# Patient Record
Sex: FEMALE | URBAN - METROPOLITAN AREA
[De-identification: names, ages, dates, MRNs, and addresses within clinical notes are randomized per-mention and may not be internally consistent; named-entity substitution may affect disease eponyms.]

---

## 2019-03-25 ENCOUNTER — APPOINTMENT (RX ONLY)
Dept: URBAN - METROPOLITAN AREA CLINIC 4 | Facility: CLINIC | Age: 74
Setting detail: DERMATOLOGY
End: 2019-03-25

## 2019-03-25 DIAGNOSIS — Z71.89 OTHER SPECIFIED COUNSELING: ICD-10-CM

## 2019-03-25 DIAGNOSIS — D22 MELANOCYTIC NEVI: ICD-10-CM

## 2019-03-25 DIAGNOSIS — D18.0 HEMANGIOMA: ICD-10-CM

## 2019-03-25 DIAGNOSIS — L57.0 ACTINIC KERATOSIS: ICD-10-CM

## 2019-03-25 DIAGNOSIS — L81.4 OTHER MELANIN HYPERPIGMENTATION: ICD-10-CM

## 2019-03-25 DIAGNOSIS — L57.8 OTHER SKIN CHANGES DUE TO CHRONIC EXPOSURE TO NONIONIZING RADIATION: ICD-10-CM

## 2019-03-25 DIAGNOSIS — L82.1 OTHER SEBORRHEIC KERATOSIS: ICD-10-CM

## 2019-03-25 PROBLEM — D22.5 MELANOCYTIC NEVI OF TRUNK: Status: ACTIVE | Noted: 2019-03-25

## 2019-03-25 PROBLEM — F41.9 ANXIETY DISORDER, UNSPECIFIED: Status: ACTIVE | Noted: 2019-03-25

## 2019-03-25 PROBLEM — I10 ESSENTIAL (PRIMARY) HYPERTENSION: Status: ACTIVE | Noted: 2019-03-25

## 2019-03-25 PROBLEM — F32.9 MAJOR DEPRESSIVE DISORDER, SINGLE EPISODE, UNSPECIFIED: Status: ACTIVE | Noted: 2019-03-25

## 2019-03-25 PROBLEM — E13.9 OTHER SPECIFIED DIABETES MELLITUS WITHOUT COMPLICATIONS: Status: ACTIVE | Noted: 2019-03-25

## 2019-03-25 PROBLEM — D18.01 HEMANGIOMA OF SKIN AND SUBCUTANEOUS TISSUE: Status: ACTIVE | Noted: 2019-03-25

## 2019-03-25 PROBLEM — D48.5 NEOPLASM OF UNCERTAIN BEHAVIOR OF SKIN: Status: ACTIVE | Noted: 2019-03-25

## 2019-03-25 PROCEDURE — 17003 DESTRUCT PREMALG LES 2-14: CPT

## 2019-03-25 PROCEDURE — ? ADDITIONAL NOTES

## 2019-03-25 PROCEDURE — ? LIQUID NITROGEN

## 2019-03-25 PROCEDURE — ? COUNSELING

## 2019-03-25 PROCEDURE — 17000 DESTRUCT PREMALG LESION: CPT | Mod: 59

## 2019-03-25 PROCEDURE — 99203 OFFICE O/P NEW LOW 30 MIN: CPT | Mod: 25

## 2019-03-25 PROCEDURE — ? BIOPSY BY SHAVE METHOD

## 2019-03-25 PROCEDURE — 11102 TANGNTL BX SKIN SINGLE LES: CPT

## 2019-03-25 ASSESSMENT — LOCATION SIMPLE DESCRIPTION DERM
LOCATION SIMPLE: LEFT CHEEK
LOCATION SIMPLE: RIGHT FOREARM
LOCATION SIMPLE: UPPER BACK
LOCATION SIMPLE: RIGHT TEMPLE
LOCATION SIMPLE: LEFT FOREARM
LOCATION SIMPLE: CHEST
LOCATION SIMPLE: LEFT LOWER BACK
LOCATION SIMPLE: RIGHT CHEEK
LOCATION SIMPLE: RIGHT LOWER BACK

## 2019-03-25 ASSESSMENT — LOCATION DETAILED DESCRIPTION DERM
LOCATION DETAILED: UPPER STERNUM
LOCATION DETAILED: LEFT INFERIOR MEDIAL MIDBACK
LOCATION DETAILED: RIGHT DISTAL DORSAL FOREARM
LOCATION DETAILED: LEFT PROXIMAL DORSAL FOREARM
LOCATION DETAILED: INFERIOR THORACIC SPINE
LOCATION DETAILED: LEFT DISTAL DORSAL FOREARM
LOCATION DETAILED: RIGHT SUPERIOR MEDIAL MIDBACK
LOCATION DETAILED: LEFT INFERIOR CENTRAL MALAR CHEEK
LOCATION DETAILED: RIGHT INFERIOR CENTRAL MALAR CHEEK
LOCATION DETAILED: RIGHT MEDIAL TEMPLE
LOCATION DETAILED: STERNAL NOTCH
LOCATION DETAILED: RIGHT PROXIMAL DORSAL FOREARM

## 2019-03-25 ASSESSMENT — LOCATION ZONE DERM
LOCATION ZONE: ARM
LOCATION ZONE: FACE
LOCATION ZONE: TRUNK

## 2019-03-25 NOTE — PROCEDURE: LIQUID NITROGEN
Consent: The patient's consent was obtained including but not limited to risks of crusting, scabbing, blistering, scarring, darker or lighter pigmentary change, recurrence, incomplete removal and infection.
Duration Of Freeze Thaw-Cycle (Seconds): 0
Post-Care Instructions: I reviewed with the patient in detail post-care instructions. Patient is to wear sunprotection, and avoid picking at any of the treated lesions. Pt may apply Vaseline  or Aquaphor to crusted or scabbing areas.
Detail Level: Detailed
Render Post-Care Instructions In Note?: no

## 2019-03-25 NOTE — HPI: SKIN LESION
Is This A New Presentation, Or A Follow-Up?: Skin Lesion
What Type Of Note Output Would You Prefer (Optional)?: Standard Output
How Severe Is Your Skin Lesion?: mild
Has Your Skin Lesion Been Treated?: been treated
Additional History: Patient says that it got cut off years ago but unsure.

## 2019-03-25 NOTE — HPI: FULL BODY SKIN EXAMINATION
How Severe Are Your Spot(S)?: mild
What Type Of Note Output Would You Prefer (Optional)?: Standard Output
What Is The Reason For Today's Visit?: Full Body Skin Examination
What Is The Reason For Today's Visit? (Being Monitored For X): concerning skin lesions on an annual basis
Additional History: Patient grew up in [place]. Hx of sunburns but not blistering. No hx of tanning beds.

## 2019-03-25 NOTE — PROCEDURE: BIOPSY BY SHAVE METHOD
Electrodesiccation Text: The wound bed was treated with electrodesiccation after the biopsy was performed.
Bill 52539 For Specimen Handling/Conveyance To Laboratory?: no
Type Of Destruction Used: Curettage
Electrodesiccation And Curettage Text: The wound bed was treated with electrodesiccation and curettage after the biopsy was performed.
Billing Type: Third-Party Bill
Was A Bandage Applied: Yes
Anesthesia Volume In Cc: 0
Detail Level: Detailed
Biopsy Method: 15 blade
Dressing: Band-Aid
Post-Care Instructions: I reviewed with the patient in detail post-care instructions. Patient is to keep the biopsy site dry overnight, and then apply bacitracin twice daily until healed. Patient may apply hydrogen peroxide soaks to remove any crusting.
Silver Nitrate Text: The wound bed was treated with silver nitrate after the biopsy was performed.
Hemostasis: Aluminum Chloride and Electrocautery
Curettage Text: The wound bed was treated with curettage after the biopsy was performed.
Notification Instructions: Patient will be notified of biopsy results. However, patient instructed to call the office if not contacted within 2 weeks.
Lab: [id number]
Size Of Lesion In Cm: 1.2
Consent: Written consent was obtained and risks were reviewed including but not limited to scarring, infection, bleeding, scabbing, incomplete removal, nerve damage and allergy to anesthesia.
Depth Of Biopsy: dermis
Cryotherapy Text: The wound bed was treated with cryotherapy after the biopsy was performed.
Lab Facility: 
Biopsy Type: H and E
Anesthesia Type: 1% lidocaine with epinephrine
Wound Care: Aquaphor

## 2019-10-06 ENCOUNTER — HOSPITAL ENCOUNTER (EMERGENCY)
Facility: MEDICAL CENTER | Age: 74
End: 2019-10-06

## 2019-10-07 ENCOUNTER — TELEPHONE (OUTPATIENT)
Dept: CARDIOTHORACIC SURGERY | Facility: MEDICAL CENTER | Age: 74
End: 2019-10-07

## 2019-10-07 NOTE — TELEPHONE ENCOUNTER
Patient no showed to her staple removal appointment.  Called [facility] in [facility] where patient is currently residing.  Spoke with the RN who states she does their wound care and she can remove the staples.  She will call if she has any problems with the wound.

## 2021-01-14 DIAGNOSIS — Z23 NEED FOR VACCINATION: ICD-10-CM

## 2022-01-01 ENCOUNTER — HOSPITAL ENCOUNTER (EMERGENCY)
Facility: MEDICAL CENTER | Age: 77
End: 2022-09-27
Attending: EMERGENCY MEDICINE
Payer: MEDICARE

## 2022-01-01 ENCOUNTER — APPOINTMENT (OUTPATIENT)
Dept: RADIOLOGY | Facility: MEDICAL CENTER | Age: 77
End: 2022-01-01
Attending: EMERGENCY MEDICINE
Payer: MEDICARE

## 2022-01-01 VITALS
BODY MASS INDEX: 19.63 KG/M2 | SYSTOLIC BLOOD PRESSURE: 158 MMHG | DIASTOLIC BLOOD PRESSURE: 70 MMHG | RESPIRATION RATE: 16 BRPM | WEIGHT: 100 LBS | HEART RATE: 98 BPM | HEIGHT: 60 IN | TEMPERATURE: 97.5 F | OXYGEN SATURATION: 92 %
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
## 2022-01-01 DIAGNOSIS — S09.90XA CLOSED HEAD INJURY, INITIAL ENCOUNTER: ICD-10-CM

## 2022-01-01 LAB — EKG IMPRESSION: NORMAL

## 2022-01-01 PROCEDURE — 72125 CT NECK SPINE W/O DYE: CPT

## 2022-01-01 PROCEDURE — 700102 HCHG RX REV CODE 250 W/ 637 OVERRIDE(OP): Performed by: EMERGENCY MEDICINE

## 2022-01-01 PROCEDURE — 93005 ELECTROCARDIOGRAM TRACING: CPT

## 2022-01-01 PROCEDURE — 93005 ELECTROCARDIOGRAM TRACING: CPT | Performed by: EMERGENCY MEDICINE

## 2022-01-01 PROCEDURE — 70450 CT HEAD/BRAIN W/O DYE: CPT

## 2022-01-01 PROCEDURE — 99284 EMERGENCY DEPT VISIT MOD MDM: CPT

## 2022-01-01 PROCEDURE — A9270 NON-COVERED ITEM OR SERVICE: HCPCS | Performed by: EMERGENCY MEDICINE

## 2022-01-01 RX ORDER — ACETAMINOPHEN 325 MG/1
650 TABLET ORAL ONCE
Status: COMPLETED | OUTPATIENT
Start: 2022-01-01 | End: 2022-01-01

## 2022-01-01 RX ADMIN — ACETAMINOPHEN 650 MG: 325 TABLET, FILM COATED ORAL at 11:50

## 2022-09-27 NOTE — DISCHARGE INSTRUCTIONS
Please schedule a follow-up appointment with your primary care physician within 24 to 48 hours for complete recheck.  Please continue to keep all scheduled follow-up appointments for evaluation of your difficulty with balance.  Return to the emergency department if you develop any new or worsening symptoms including worsening headache, nausea, vomiting, confusion, or any further concerns.

## 2022-09-27 NOTE — ED NOTES
Pt arrives via EMS post fall in shower, pt states she fell and hit her head on the tile, pt states she takes a thinner, but unsure what thinner. AOX4, c-collar in place for neck pain, pt complains of headache 5/10, denies chest pain, shortness of breath

## 2022-09-27 NOTE — ED NOTES
Pt taken out in wheel chair per request. Pt d/c from ED a/o x 4 GCS 15 ambulatory without assistance with steady gait.

## 2022-09-27 NOTE — ED NOTES
Pt ambulated to restroom without assistance with steady gait. Pt calling grandson to for ride home.

## 2022-09-27 NOTE — ED PROVIDER NOTES
ED Physician Note    Chief Concern:   Head injury, fall    HPI:  Yesika Aguilar is a very pleasant 77 year old woman who presents to the emergency department today for evaluation after a fall.  She has a longstanding history of disequilibrium, states that she has been seen multiple times by her primary care physician, and has had multiple tests done, yet they are not able to figure out the cause.  This has been going on for the last 9 to 10 months.  She was getting in the shower today when she had an episode of disequilibrium, she did not lose consciousness, but fell striking her head.  Paramedics were called, she also reported some neck pain so cervical collar was placed prior to arrival.  She was then transported to the emergency department for further evaluation.  She states that she is currently on anticoagulation, however she is uncertain as to what medication she is taking.  Paramedics did not have a medication list with them when they brought her to the emergency department.  She does report a headache localized to the vertex, with some radiation towards the occipital region.  She does not have any nausea, she has had no vomiting, she has normal mental status.  She did report some neck pain which is now worse after placement of the cervical collar.  She reports no pain or injury to the arms or legs, no abdominal pain, no chest pain.  She had no preceding chest pain, shortness of breath, nor lightheadedness.  She is unable to identify any reliably exacerbating or alleviating factors.    Review of Systems:  See HPI for pertinent positives and negatives. All other systems negative.    Past Medical History:   has a past medical history of Hypertension.    Social History:  Social History     Tobacco Use    Smoking status: Not on file    Smokeless tobacco: Not on file   Substance and Sexual Activity    Alcohol use: Not on file    Drug use: Not on file    Sexual activity: Not on file       Surgical  History:  patient denies any surgical history    Current Medications:  Home Medications    **Home medications have not yet been reviewed for this encounter**         Allergies:  Not on File    Physical Exam:  Vital Signs: BP (!) 158/70   Pulse 98   Temp 36.4 °C (97.5 °F) (Temporal)   Resp 16   Ht 1.524 m (5')   Wt 45.4 kg (100 lb)   SpO2 92%   BMI 19.53 kg/m²   Constitutional: Alert, no acute distress  HENT: Normocephalic, atraumatic, no facial abrasions, no lacerations  Eyes: Pupils equal and reactive, normal conjunctiva, no periorbital edema  Neck: Supple, normal range of motion, no stridor, she does have some tenderness to palpation of paraspinous musculature, no localizable point bony tenderness to palpation, no palpable deformity  Cardiovascular: Extremities are warm and well perfused, no murmur appreciated, normal cardiac auscultation  Pulmonary: No respiratory distress, normal work of breathing, no accessory muscule usage, breath sounds clear and equal bilaterally, no wheezing, no coarse breath sounds  Abdomen: Soft, non-distended, non-tender to palpation  Skin: Warm, dry, no rashes or lesions  Musculoskeletal: Normal range of motion in all extremities, no swelling or deformity noted  Neurologic: Awake, alert, follows commands, no facial droop, no slurred speech, moves all 4 extremities  Psychiatric: Normal and appropriate mood and affect    Medical records reviewed for continuity of care.  No recent visits to this facility for similar symptoms.    EKG: Rate 69, sinus rhythm, no ST elevation or depression, no T wave inversions, no ectopy.    Labs:  Labs Reviewed - No data to display    Radiology:  CT-HEAD W/O   Final Result      1.  No evidence of acute territorial infarct, intracranial hemorrhage or mass lesion.   2.  Mild diffuse cerebral substance loss.   3.  Mild microangiopathic ischemic change versus demyelination or gliosis.         CT-CSPINE WITHOUT PLUS RECONS   Final Result      Degenerative  and postsurgical changes of the cervical spine without fracture or malalignment.           ED Medications Administered:  Medications   acetaminophen (Tylenol) tablet 650 mg (650 mg Oral Given 9/27/22 1150)       Differential diagnosis:  Intracranial injury, vertigo, Ménière's, acoustic neuroma, C-spine injury, soft tissue injury, musculoskeletal pain, concussion, closed head injury    MDM:  Ms. Aguilar presents to the emergency department today for evaluation of headache and neck pain after a fall from standing.  She did have a preceding episode of disequilibrium, she states she is been dealing with the symptoms for nearly a year.  She has been followed up, states she is had a full work-up, and no etiology has been found.  She also states she is on an anticoagulation agent, though is uncertain as to what that medication is.  She has no focal neurologic deficits on exam.    CT head is ordered, this demonstrates no evidence of acute infarct, hemorrhage, or mass lesion.  There is mild diffuse cerebral substance loss.  CT cervical spine demonstrates degenerative posterior Changes of the Cervical Spine without Fracture or Malalignment.    C-collar was removed, she was provided with some snacks and a drink.  Neck pain immediately improved on removal of the c-collar.  She remains without bony midline tenderness on reassessment.  She was easily able to ambulate, she is no longer having symptoms of disequilibrium.  At this time she is stable for discharge home.  She is counseled to follow-up with her primary care physician to review her visit to the emergency department.  She will call at the opening of business hours. Return precautions were discussed with the patient, and provided in written form with the patient's discharge instructions.     Personal protective equipment including N95 surgical respirator, goggles, and gloves were used during this encounter.       Disposition:  Discharge home in stable condition    Final  Impression:  1. Fall, initial encounter    2. Closed head injury, initial encounter

## 2023-01-01 ENCOUNTER — HOME CARE VISIT (OUTPATIENT)
Dept: HOSPICE | Facility: HOSPICE | Age: 78
End: 2023-01-01
Payer: MEDICARE

## 2023-01-01 ENCOUNTER — APPOINTMENT (OUTPATIENT)
Dept: RADIOLOGY | Facility: MEDICAL CENTER | Age: 78
DRG: 682 | End: 2023-01-01
Attending: STUDENT IN AN ORGANIZED HEALTH CARE EDUCATION/TRAINING PROGRAM
Payer: MEDICARE

## 2023-01-01 ENCOUNTER — APPOINTMENT (OUTPATIENT)
Dept: RADIOLOGY | Facility: MEDICAL CENTER | Age: 78
DRG: 682 | End: 2023-01-01
Attending: ORTHOPAEDIC SURGERY
Payer: MEDICARE

## 2023-01-01 ENCOUNTER — APPOINTMENT (OUTPATIENT)
Dept: RADIOLOGY | Facility: MEDICAL CENTER | Age: 78
End: 2023-01-01
Attending: EMERGENCY MEDICINE
Payer: MEDICARE

## 2023-01-01 ENCOUNTER — HOSPITAL ENCOUNTER (EMERGENCY)
Facility: MEDICAL CENTER | Age: 78
End: 2023-04-15
Attending: EMERGENCY MEDICINE
Payer: MEDICARE

## 2023-01-01 ENCOUNTER — HOSPITAL ENCOUNTER (INPATIENT)
Facility: MEDICAL CENTER | Age: 78
LOS: 4 days | DRG: 682 | End: 2023-08-17
Attending: STUDENT IN AN ORGANIZED HEALTH CARE EDUCATION/TRAINING PROGRAM | Admitting: STUDENT IN AN ORGANIZED HEALTH CARE EDUCATION/TRAINING PROGRAM
Payer: MEDICARE

## 2023-01-01 ENCOUNTER — HOSPICE ADMISSION (OUTPATIENT)
Dept: HOSPICE | Facility: HOSPICE | Age: 78
End: 2023-01-01
Payer: MEDICARE

## 2023-01-01 VITALS
WEIGHT: 100 LBS | HEART RATE: 77 BPM | RESPIRATION RATE: 16 BRPM | BODY MASS INDEX: 19.63 KG/M2 | SYSTOLIC BLOOD PRESSURE: 122 MMHG | TEMPERATURE: 98.1 F | OXYGEN SATURATION: 93 % | DIASTOLIC BLOOD PRESSURE: 70 MMHG | HEIGHT: 60 IN

## 2023-01-01 VITALS
RESPIRATION RATE: 5 BRPM | DIASTOLIC BLOOD PRESSURE: 55 MMHG | WEIGHT: 125.66 LBS | TEMPERATURE: 98.8 F | HEART RATE: 92 BPM | HEIGHT: 60 IN | OXYGEN SATURATION: 77 % | SYSTOLIC BLOOD PRESSURE: 112 MMHG | BODY MASS INDEX: 24.67 KG/M2

## 2023-01-01 DIAGNOSIS — R29.6 FREQUENT FALLS: ICD-10-CM

## 2023-01-01 DIAGNOSIS — R53.81 DEBILITY: ICD-10-CM

## 2023-01-01 DIAGNOSIS — S09.90XA CLOSED HEAD INJURY, INITIAL ENCOUNTER: ICD-10-CM

## 2023-01-01 DIAGNOSIS — I73.9 PERIPHERAL ARTERY DISEASE (HCC): ICD-10-CM

## 2023-01-01 DIAGNOSIS — S72.142A CLOSED 2-PART INTERTROCHANTERIC FRACTURE OF LEFT FEMUR, INITIAL ENCOUNTER (HCC): ICD-10-CM

## 2023-01-01 DIAGNOSIS — R79.89 ELEVATED LACTIC ACID LEVEL: ICD-10-CM
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
## 2023-01-01 DIAGNOSIS — S72.142A CLOSED DISPLACED INTERTROCHANTERIC FRACTURE OF LEFT FEMUR, INITIAL ENCOUNTER (HCC): ICD-10-CM

## 2023-01-01 DIAGNOSIS — I25.5 ISCHEMIC CARDIOMYOPATHY: ICD-10-CM

## 2023-01-01 LAB
ALBUMIN SERPL BCP-MCNC: 2.6 G/DL (ref 3.2–4.9)
ALBUMIN SERPL BCP-MCNC: 2.8 G/DL (ref 3.2–4.9)
ALBUMIN/GLOB SERPL: 1.2 G/DL
ALBUMIN/GLOB SERPL: 1.2 G/DL
ALP SERPL-CCNC: 42 U/L (ref 30–99)
ALP SERPL-CCNC: 47 U/L (ref 30–99)
ALT SERPL-CCNC: 34 U/L (ref 2–50)
ALT SERPL-CCNC: 34 U/L (ref 2–50)
AMORPH CRY #/AREA URNS HPF: PRESENT /HPF
ANION GAP SERPL CALC-SCNC: 16 MMOL/L (ref 7–16)
ANION GAP SERPL CALC-SCNC: 19 MMOL/L (ref 7–16)
APPEARANCE UR: CLEAR
AST SERPL-CCNC: 86 U/L (ref 12–45)
AST SERPL-CCNC: 98 U/L (ref 12–45)
BACTERIA #/AREA URNS HPF: NEGATIVE /HPF
BASOPHILS # BLD AUTO: 0.1 % (ref 0–1.8)
BASOPHILS # BLD AUTO: 0.1 % (ref 0–1.8)
BASOPHILS # BLD: 0.01 K/UL (ref 0–0.12)
BASOPHILS # BLD: 0.01 K/UL (ref 0–0.12)
BILIRUB SERPL-MCNC: 0.3 MG/DL (ref 0.1–1.5)
BILIRUB SERPL-MCNC: 0.4 MG/DL (ref 0.1–1.5)
BILIRUB UR QL STRIP.AUTO: NEGATIVE
BUN SERPL-MCNC: 46 MG/DL (ref 8–22)
BUN SERPL-MCNC: 50 MG/DL (ref 8–22)
CALCIUM ALBUM COR SERPL-MCNC: 8.8 MG/DL (ref 8.5–10.5)
CALCIUM ALBUM COR SERPL-MCNC: 9.2 MG/DL (ref 8.5–10.5)
CALCIUM SERPL-MCNC: 7.7 MG/DL (ref 8.5–10.5)
CALCIUM SERPL-MCNC: 8.2 MG/DL (ref 8.5–10.5)
CHLORIDE SERPL-SCNC: 101 MMOL/L (ref 96–112)
CHLORIDE SERPL-SCNC: 97 MMOL/L (ref 96–112)
CK SERPL-CCNC: 1220 U/L (ref 0–154)
CK SERPL-CCNC: 1746 U/L (ref 0–154)
CO2 SERPL-SCNC: 13 MMOL/L (ref 20–33)
CO2 SERPL-SCNC: 16 MMOL/L (ref 20–33)
COLOR UR: ABNORMAL
CREAT SERPL-MCNC: 1.4 MG/DL (ref 0.5–1.4)
CREAT SERPL-MCNC: 1.89 MG/DL (ref 0.5–1.4)
EKG IMPRESSION: NORMAL
EOSINOPHIL # BLD AUTO: 0 K/UL (ref 0–0.51)
EOSINOPHIL # BLD AUTO: 0 K/UL (ref 0–0.51)
EOSINOPHIL NFR BLD: 0 % (ref 0–6.9)
EOSINOPHIL NFR BLD: 0 % (ref 0–6.9)
EPI CELLS #/AREA URNS HPF: NEGATIVE /HPF
ERYTHROCYTE [DISTWIDTH] IN BLOOD BY AUTOMATED COUNT: 48.3 FL (ref 35.9–50)
ERYTHROCYTE [DISTWIDTH] IN BLOOD BY AUTOMATED COUNT: 51 FL (ref 35.9–50)
FERRITIN SERPL-MCNC: 114 NG/ML (ref 10–291)
FLUAV RNA SPEC QL NAA+PROBE: NEGATIVE
FLUBV RNA SPEC QL NAA+PROBE: NEGATIVE
GFR SERPLBLD CREATININE-BSD FMLA CKD-EPI: 27 ML/MIN/1.73 M 2
GFR SERPLBLD CREATININE-BSD FMLA CKD-EPI: 38 ML/MIN/1.73 M 2
GLOBULIN SER CALC-MCNC: 2.1 G/DL (ref 1.9–3.5)
GLOBULIN SER CALC-MCNC: 2.3 G/DL (ref 1.9–3.5)
GLUCOSE SERPL-MCNC: 77 MG/DL (ref 65–99)
GLUCOSE SERPL-MCNC: 90 MG/DL (ref 65–99)
GLUCOSE UR STRIP.AUTO-MCNC: NEGATIVE MG/DL
GRAN CASTS #/AREA URNS LPF: ABNORMAL /LPF
HCT VFR BLD AUTO: 24.8 % (ref 37–47)
HCT VFR BLD AUTO: 30.2 % (ref 37–47)
HGB BLD-MCNC: 8.1 G/DL (ref 12–16)
HGB BLD-MCNC: 9.8 G/DL (ref 12–16)
HGB RETIC QN AUTO: 31.2 PG/CELL (ref 29–35)
HYALINE CASTS #/AREA URNS LPF: ABNORMAL /LPF
IMM GRANULOCYTES # BLD AUTO: 0.05 K/UL (ref 0–0.11)
IMM GRANULOCYTES # BLD AUTO: 0.09 K/UL (ref 0–0.11)
IMM GRANULOCYTES NFR BLD AUTO: 0.4 % (ref 0–0.9)
IMM GRANULOCYTES NFR BLD AUTO: 0.7 % (ref 0–0.9)
IMM RETICS NFR: 17.8 % (ref 2.6–16.1)
IRON SATN MFR SERPL: 9 % (ref 15–55)
IRON SERPL-MCNC: 25 UG/DL (ref 40–170)
KETONES UR STRIP.AUTO-MCNC: 15 MG/DL
LACTATE SERPL-SCNC: 1.9 MMOL/L (ref 0.5–2)
LACTATE SERPL-SCNC: 3.1 MMOL/L (ref 0.5–2)
LEUKOCYTE ESTERASE UR QL STRIP.AUTO: NEGATIVE
LYMPHOCYTES # BLD AUTO: 0.57 K/UL (ref 1–4.8)
LYMPHOCYTES # BLD AUTO: 1.01 K/UL (ref 1–4.8)
LYMPHOCYTES NFR BLD: 4.2 % (ref 22–41)
LYMPHOCYTES NFR BLD: 8.4 % (ref 22–41)
MAGNESIUM SERPL-MCNC: 1.7 MG/DL (ref 1.5–2.5)
MCH RBC QN AUTO: 28.9 PG (ref 27–33)
MCH RBC QN AUTO: 29.9 PG (ref 27–33)
MCHC RBC AUTO-ENTMCNC: 32.5 G/DL (ref 32.2–35.5)
MCHC RBC AUTO-ENTMCNC: 32.7 G/DL (ref 32.2–35.5)
MCV RBC AUTO: 89.1 FL (ref 81.4–97.8)
MCV RBC AUTO: 91.5 FL (ref 81.4–97.8)
MICRO URNS: ABNORMAL
MONOCYTES # BLD AUTO: 1.26 K/UL (ref 0–0.85)
MONOCYTES # BLD AUTO: 1.33 K/UL (ref 0–0.85)
MONOCYTES NFR BLD AUTO: 10.5 % (ref 0–13.4)
MONOCYTES NFR BLD AUTO: 9.7 % (ref 0–13.4)
NEUTROPHILS # BLD AUTO: 11.73 K/UL (ref 1.82–7.42)
NEUTROPHILS # BLD AUTO: 9.63 K/UL (ref 1.82–7.42)
NEUTROPHILS NFR BLD: 80.6 % (ref 44–72)
NEUTROPHILS NFR BLD: 85.3 % (ref 44–72)
NITRITE UR QL STRIP.AUTO: NEGATIVE
NRBC # BLD AUTO: 0 K/UL
NRBC # BLD AUTO: 0 K/UL
NRBC BLD-RTO: 0 /100 WBC (ref 0–0.2)
NRBC BLD-RTO: 0 /100 WBC (ref 0–0.2)
PH UR STRIP.AUTO: 5 [PH] (ref 5–8)
PHOSPHATE SERPL-MCNC: 5.4 MG/DL (ref 2.5–4.5)
PLATELET # BLD AUTO: 182 K/UL (ref 164–446)
PLATELET # BLD AUTO: 192 K/UL (ref 164–446)
PMV BLD AUTO: 10 FL (ref 9–12.9)
PMV BLD AUTO: 10.2 FL (ref 9–12.9)
POTASSIUM SERPL-SCNC: 3.7 MMOL/L (ref 3.6–5.5)
POTASSIUM SERPL-SCNC: 4 MMOL/L (ref 3.6–5.5)
PROCALCITONIN SERPL-MCNC: 0.41 NG/ML
PROT SERPL-MCNC: 4.7 G/DL (ref 6–8.2)
PROT SERPL-MCNC: 5.1 G/DL (ref 6–8.2)
PROT UR QL STRIP: 30 MG/DL
RBC # BLD AUTO: 2.71 M/UL (ref 4.2–5.4)
RBC # BLD AUTO: 3.39 M/UL (ref 4.2–5.4)
RBC # URNS HPF: ABNORMAL /HPF
RBC UR QL AUTO: ABNORMAL
RENAL EPI CELLS #/AREA URNS HPF: ABNORMAL /HPF
RETICS # AUTO: 0.07 M/UL (ref 0.04–0.12)
RETICS/RBC NFR: 2.2 % (ref 0.8–2.6)
RSV RNA SPEC QL NAA+PROBE: NEGATIVE
SARS-COV-2 RNA RESP QL NAA+PROBE: DETECTED
SODIUM SERPL-SCNC: 129 MMOL/L (ref 135–145)
SODIUM SERPL-SCNC: 133 MMOL/L (ref 135–145)
SP GR UR STRIP.AUTO: 1.03
SPECIMEN SOURCE: ABNORMAL
TIBC SERPL-MCNC: 276 UG/DL (ref 250–450)
TROPONIN T SERPL-MCNC: 81 NG/L (ref 6–19)
TROPONIN T SERPL-MCNC: 87 NG/L (ref 6–19)
TSH SERPL DL<=0.005 MIU/L-ACNC: 2.33 UIU/ML (ref 0.38–5.33)
UIBC SERPL-MCNC: 251 UG/DL (ref 110–370)
UROBILINOGEN UR STRIP.AUTO-MCNC: 1 MG/DL
VIT B12 SERPL-MCNC: 422 PG/ML (ref 211–911)
WBC # BLD AUTO: 12 K/UL (ref 4.8–10.8)
WBC # BLD AUTO: 13.7 K/UL (ref 4.8–10.8)
WBC #/AREA URNS HPF: ABNORMAL /HPF

## 2023-01-01 PROCEDURE — 700111 HCHG RX REV CODE 636 W/ 250 OVERRIDE (IP): Mod: JZ

## 2023-01-01 PROCEDURE — 36415 COLL VENOUS BLD VENIPUNCTURE: CPT

## 2023-01-01 PROCEDURE — 700102 HCHG RX REV CODE 250 W/ 637 OVERRIDE(OP): Performed by: STUDENT IN AN ORGANIZED HEALTH CARE EDUCATION/TRAINING PROGRAM

## 2023-01-01 PROCEDURE — C9803 HOPD COVID-19 SPEC COLLECT: HCPCS | Performed by: STUDENT IN AN ORGANIZED HEALTH CARE EDUCATION/TRAINING PROGRAM

## 2023-01-01 PROCEDURE — A9270 NON-COVERED ITEM OR SERVICE: HCPCS | Performed by: STUDENT IN AN ORGANIZED HEALTH CARE EDUCATION/TRAINING PROGRAM

## 2023-01-01 PROCEDURE — 99232 SBSQ HOSP IP/OBS MODERATE 35: CPT | Mod: GC | Performed by: STUDENT IN AN ORGANIZED HEALTH CARE EDUCATION/TRAINING PROGRAM

## 2023-01-01 PROCEDURE — 770001 HCHG ROOM/CARE - MED/SURG/GYN PRIV*

## 2023-01-01 PROCEDURE — 99285 EMERGENCY DEPT VISIT HI MDM: CPT

## 2023-01-01 PROCEDURE — 700105 HCHG RX REV CODE 258: Mod: JZ | Performed by: STUDENT IN AN ORGANIZED HEALTH CARE EDUCATION/TRAINING PROGRAM

## 2023-01-01 PROCEDURE — 87040 BLOOD CULTURE FOR BACTERIA: CPT

## 2023-01-01 PROCEDURE — 84443 ASSAY THYROID STIM HORMONE: CPT

## 2023-01-01 PROCEDURE — 0241U HCHG SARS-COV-2 COVID-19 NFCT DS RESP RNA 4 TRGT MIC: CPT

## 2023-01-01 PROCEDURE — A9270 NON-COVERED ITEM OR SERVICE: HCPCS

## 2023-01-01 PROCEDURE — 700111 HCHG RX REV CODE 636 W/ 250 OVERRIDE (IP): Mod: JZ | Performed by: STUDENT IN AN ORGANIZED HEALTH CARE EDUCATION/TRAINING PROGRAM

## 2023-01-01 PROCEDURE — 84145 PROCALCITONIN (PCT): CPT

## 2023-01-01 PROCEDURE — 83605 ASSAY OF LACTIC ACID: CPT | Mod: 91

## 2023-01-01 PROCEDURE — 96375 TX/PRO/DX INJ NEW DRUG ADDON: CPT

## 2023-01-01 PROCEDURE — 72170 X-RAY EXAM OF PELVIS: CPT

## 2023-01-01 PROCEDURE — 82550 ASSAY OF CK (CPK): CPT

## 2023-01-01 PROCEDURE — 99232 SBSQ HOSP IP/OBS MODERATE 35: CPT | Performed by: STUDENT IN AN ORGANIZED HEALTH CARE EDUCATION/TRAINING PROGRAM

## 2023-01-01 PROCEDURE — 72125 CT NECK SPINE W/O DYE: CPT

## 2023-01-01 PROCEDURE — 84484 ASSAY OF TROPONIN QUANT: CPT | Mod: 91

## 2023-01-01 PROCEDURE — 80053 COMPREHEN METABOLIC PANEL: CPT

## 2023-01-01 PROCEDURE — 96365 THER/PROPH/DIAG IV INF INIT: CPT

## 2023-01-01 PROCEDURE — 71260 CT THORAX DX C+: CPT

## 2023-01-01 PROCEDURE — 83540 ASSAY OF IRON: CPT

## 2023-01-01 PROCEDURE — 85025 COMPLETE CBC W/AUTO DIFF WBC: CPT

## 2023-01-01 PROCEDURE — 303747 HCHG EXTRA SUTURE

## 2023-01-01 PROCEDURE — 304999 HCHG REPAIR-SIMPLE/INTERMED LEVEL 1

## 2023-01-01 PROCEDURE — 700105 HCHG RX REV CODE 258

## 2023-01-01 PROCEDURE — 700117 HCHG RX CONTRAST REV CODE 255: Performed by: STUDENT IN AN ORGANIZED HEALTH CARE EDUCATION/TRAINING PROGRAM

## 2023-01-01 PROCEDURE — 70486 CT MAXILLOFACIAL W/O DYE: CPT

## 2023-01-01 PROCEDURE — 81001 URINALYSIS AUTO W/SCOPE: CPT

## 2023-01-01 PROCEDURE — 83550 IRON BINDING TEST: CPT

## 2023-01-01 PROCEDURE — 99223 1ST HOSP IP/OBS HIGH 75: CPT | Mod: AI,25 | Performed by: STUDENT IN AN ORGANIZED HEALTH CARE EDUCATION/TRAINING PROGRAM

## 2023-01-01 PROCEDURE — 303105 HCHG CATHETER EXTRA

## 2023-01-01 PROCEDURE — 82607 VITAMIN B-12: CPT

## 2023-01-01 PROCEDURE — 84100 ASSAY OF PHOSPHORUS: CPT

## 2023-01-01 PROCEDURE — 700102 HCHG RX REV CODE 250 W/ 637 OVERRIDE(OP)

## 2023-01-01 PROCEDURE — 99497 ADVNCD CARE PLAN 30 MIN: CPT | Mod: 25 | Performed by: STUDENT IN AN ORGANIZED HEALTH CARE EDUCATION/TRAINING PROGRAM

## 2023-01-01 PROCEDURE — 99285 EMERGENCY DEPT VISIT HI MDM: CPT | Mod: 25

## 2023-01-01 PROCEDURE — 72128 CT CHEST SPINE W/O DYE: CPT

## 2023-01-01 PROCEDURE — 51702 INSERT TEMP BLADDER CATH: CPT

## 2023-01-01 PROCEDURE — 0HQ0XZZ REPAIR SCALP SKIN, EXTERNAL APPROACH: ICD-10-PCS | Performed by: STUDENT IN AN ORGANIZED HEALTH CARE EDUCATION/TRAINING PROGRAM

## 2023-01-01 PROCEDURE — 700111 HCHG RX REV CODE 636 W/ 250 OVERRIDE (IP)

## 2023-01-01 PROCEDURE — 99231 SBSQ HOSP IP/OBS SF/LOW 25: CPT | Performed by: INTERNAL MEDICINE

## 2023-01-01 PROCEDURE — 82728 ASSAY OF FERRITIN: CPT

## 2023-01-01 PROCEDURE — 96376 TX/PRO/DX INJ SAME DRUG ADON: CPT

## 2023-01-01 PROCEDURE — 72131 CT LUMBAR SPINE W/O DYE: CPT

## 2023-01-01 PROCEDURE — 700101 HCHG RX REV CODE 250: Performed by: STUDENT IN AN ORGANIZED HEALTH CARE EDUCATION/TRAINING PROGRAM

## 2023-01-01 PROCEDURE — 83735 ASSAY OF MAGNESIUM: CPT

## 2023-01-01 PROCEDURE — 73552 X-RAY EXAM OF FEMUR 2/>: CPT | Mod: LT

## 2023-01-01 PROCEDURE — 70450 CT HEAD/BRAIN W/O DYE: CPT

## 2023-01-01 PROCEDURE — 93005 ELECTROCARDIOGRAM TRACING: CPT | Performed by: STUDENT IN AN ORGANIZED HEALTH CARE EDUCATION/TRAINING PROGRAM

## 2023-01-01 PROCEDURE — 304217 HCHG IRRIGATION SYSTEM

## 2023-01-01 PROCEDURE — 85046 RETICYTE/HGB CONCENTRATE: CPT

## 2023-01-01 RX ORDER — OXYCODONE HYDROCHLORIDE 5 MG/1
2.5 TABLET ORAL
Status: DISCONTINUED | OUTPATIENT
Start: 2023-01-01 | End: 2023-01-01

## 2023-01-01 RX ORDER — BISACODYL 10 MG
10 SUPPOSITORY, RECTAL RECTAL
Status: DISCONTINUED | OUTPATIENT
Start: 2023-01-01 | End: 2023-08-18 | Stop reason: HOSPADM

## 2023-01-01 RX ORDER — MORPHINE SULFATE 4 MG/ML
4 INJECTION INTRAVENOUS ONCE
Status: COMPLETED | OUTPATIENT
Start: 2023-01-01 | End: 2023-01-01

## 2023-01-01 RX ORDER — OMEPRAZOLE 20 MG/1
40 CAPSULE, DELAYED RELEASE ORAL DAILY
Status: DISCONTINUED | OUTPATIENT
Start: 2023-01-01 | End: 2023-01-01

## 2023-01-01 RX ORDER — PRASUGREL 10 MG/1
5 TABLET, FILM COATED ORAL DAILY
Status: DISCONTINUED | OUTPATIENT
Start: 2023-01-01 | End: 2023-01-01

## 2023-01-01 RX ORDER — ATROPINE SULFATE 10 MG/ML
2 SOLUTION/ DROPS OPHTHALMIC EVERY 4 HOURS PRN
Status: DISCONTINUED | OUTPATIENT
Start: 2023-01-01 | End: 2023-08-18 | Stop reason: HOSPADM

## 2023-01-01 RX ORDER — ACETAMINOPHEN 500 MG
1000 TABLET ORAL 3 TIMES DAILY
Status: DISCONTINUED | OUTPATIENT
Start: 2023-01-01 | End: 2023-08-18 | Stop reason: HOSPADM

## 2023-01-01 RX ORDER — LISINOPRIL 20 MG/1
20 TABLET ORAL DAILY
COMMUNITY

## 2023-01-01 RX ORDER — HYDRALAZINE HYDROCHLORIDE 20 MG/ML
10 INJECTION INTRAMUSCULAR; INTRAVENOUS EVERY 4 HOURS PRN
Status: DISCONTINUED | OUTPATIENT
Start: 2023-01-01 | End: 2023-01-01

## 2023-01-01 RX ORDER — SODIUM CHLORIDE, SODIUM LACTATE, POTASSIUM CHLORIDE, CALCIUM CHLORIDE 600; 310; 30; 20 MG/100ML; MG/100ML; MG/100ML; MG/100ML
1000 INJECTION, SOLUTION INTRAVENOUS ONCE
Status: DISCONTINUED | OUTPATIENT
Start: 2023-01-01 | End: 2023-01-01

## 2023-01-01 RX ORDER — AMOXICILLIN 250 MG
2 CAPSULE ORAL 2 TIMES DAILY
Status: DISCONTINUED | OUTPATIENT
Start: 2023-01-01 | End: 2023-01-01

## 2023-01-01 RX ORDER — SODIUM CHLORIDE, SODIUM LACTATE, POTASSIUM CHLORIDE, AND CALCIUM CHLORIDE .6; .31; .03; .02 G/100ML; G/100ML; G/100ML; G/100ML
1000 INJECTION, SOLUTION INTRAVENOUS ONCE
Status: DISCONTINUED | OUTPATIENT
Start: 2023-01-01 | End: 2023-01-01

## 2023-01-01 RX ORDER — OXYCODONE HYDROCHLORIDE 5 MG/1
5 TABLET ORAL
Status: DISCONTINUED | OUTPATIENT
Start: 2023-01-01 | End: 2023-01-01

## 2023-01-01 RX ORDER — SODIUM CHLORIDE, SODIUM LACTATE, POTASSIUM CHLORIDE, CALCIUM CHLORIDE 600; 310; 30; 20 MG/100ML; MG/100ML; MG/100ML; MG/100ML
1000 INJECTION, SOLUTION INTRAVENOUS ONCE
Status: COMPLETED | OUTPATIENT
Start: 2023-01-01 | End: 2023-01-01

## 2023-01-01 RX ORDER — GUAIFENESIN/DEXTROMETHORPHAN 100-10MG/5
10 SYRUP ORAL EVERY 6 HOURS PRN
Status: DISCONTINUED | OUTPATIENT
Start: 2023-01-01 | End: 2023-08-18 | Stop reason: HOSPADM

## 2023-01-01 RX ORDER — LISINOPRIL 20 MG/1
20 TABLET ORAL DAILY
Status: DISCONTINUED | OUTPATIENT
Start: 2023-01-01 | End: 2023-01-01

## 2023-01-01 RX ORDER — DEXAMETHASONE SODIUM PHOSPHATE 4 MG/ML
6 INJECTION, SOLUTION INTRA-ARTICULAR; INTRALESIONAL; INTRAMUSCULAR; INTRAVENOUS; SOFT TISSUE DAILY
Status: DISCONTINUED | OUTPATIENT
Start: 2023-01-01 | End: 2023-01-01

## 2023-01-01 RX ORDER — LORAZEPAM 2 MG/ML
1 CONCENTRATE ORAL
Status: DISCONTINUED | OUTPATIENT
Start: 2023-01-01 | End: 2023-08-18 | Stop reason: HOSPADM

## 2023-01-01 RX ORDER — HYDROMORPHONE HYDROCHLORIDE 1 MG/ML
0.25 INJECTION, SOLUTION INTRAMUSCULAR; INTRAVENOUS; SUBCUTANEOUS
Status: DISCONTINUED | OUTPATIENT
Start: 2023-01-01 | End: 2023-01-01

## 2023-01-01 RX ORDER — SODIUM CHLORIDE, SODIUM LACTATE, POTASSIUM CHLORIDE, CALCIUM CHLORIDE 600; 310; 30; 20 MG/100ML; MG/100ML; MG/100ML; MG/100ML
INJECTION, SOLUTION INTRAVENOUS CONTINUOUS
Status: DISCONTINUED | OUTPATIENT
Start: 2023-01-01 | End: 2023-01-01

## 2023-01-01 RX ORDER — POLYETHYLENE GLYCOL 3350 17 G/17G
1 POWDER, FOR SOLUTION ORAL
Status: DISCONTINUED | OUTPATIENT
Start: 2023-01-01 | End: 2023-08-18 | Stop reason: HOSPADM

## 2023-01-01 RX ORDER — ONDANSETRON 4 MG/1
4 TABLET, ORALLY DISINTEGRATING ORAL EVERY 4 HOURS PRN
Status: DISCONTINUED | OUTPATIENT
Start: 2023-01-01 | End: 2023-08-18 | Stop reason: HOSPADM

## 2023-01-01 RX ORDER — LORAZEPAM 2 MG/ML
1 INJECTION INTRAMUSCULAR
Status: DISCONTINUED | OUTPATIENT
Start: 2023-01-01 | End: 2023-08-18 | Stop reason: HOSPADM

## 2023-01-01 RX ORDER — OMEPRAZOLE 40 MG/1
40 CAPSULE, DELAYED RELEASE ORAL DAILY
COMMUNITY

## 2023-01-01 RX ORDER — ONDANSETRON 2 MG/ML
4 INJECTION INTRAMUSCULAR; INTRAVENOUS EVERY 4 HOURS PRN
Status: DISCONTINUED | OUTPATIENT
Start: 2023-01-01 | End: 2023-08-18 | Stop reason: HOSPADM

## 2023-01-01 RX ORDER — SODIUM CHLORIDE 9 MG/ML
1000 INJECTION, SOLUTION INTRAVENOUS ONCE
Status: COMPLETED | OUTPATIENT
Start: 2023-01-01 | End: 2023-01-01

## 2023-01-01 RX ORDER — PRASUGREL 5 MG/1
5 TABLET, FILM COATED ORAL DAILY
COMMUNITY

## 2023-01-01 RX ORDER — CEFTRIAXONE 1 G/1
1000 INJECTION, POWDER, FOR SOLUTION INTRAMUSCULAR; INTRAVENOUS ONCE
Status: DISCONTINUED | OUTPATIENT
Start: 2023-01-01 | End: 2023-01-01

## 2023-01-01 RX ADMIN — LORAZEPAM 1 MG: 2 INJECTION INTRAMUSCULAR; INTRAVENOUS at 20:56

## 2023-01-01 RX ADMIN — MORPHINE SULFATE 10 MG: 10 INJECTION INTRAVENOUS at 12:14

## 2023-01-01 RX ADMIN — MORPHINE SULFATE 5 MG: 10 INJECTION INTRAVENOUS at 22:26

## 2023-01-01 RX ADMIN — LORAZEPAM 1 MG: 2 INJECTION INTRAMUSCULAR; INTRAVENOUS at 19:28

## 2023-01-01 RX ADMIN — LORAZEPAM 1 MG: 2 LIQUID ORAL at 06:03

## 2023-01-01 RX ADMIN — LORAZEPAM 1 MG: 2 INJECTION INTRAMUSCULAR; INTRAVENOUS at 15:03

## 2023-01-01 RX ADMIN — MORPHINE SULFATE 10 MG: 10 INJECTION INTRAVENOUS at 15:02

## 2023-01-01 RX ADMIN — SODIUM CHLORIDE, POTASSIUM CHLORIDE, SODIUM LACTATE AND CALCIUM CHLORIDE 1000 ML: 600; 310; 30; 20 INJECTION, SOLUTION INTRAVENOUS at 18:04

## 2023-01-01 RX ADMIN — LORAZEPAM 1 MG: 2 INJECTION INTRAMUSCULAR; INTRAVENOUS at 10:23

## 2023-01-01 RX ADMIN — MORPHINE SULFATE 10 MG: 10 INJECTION INTRAVENOUS at 17:23

## 2023-01-01 RX ADMIN — MORPHINE SULFATE 5 MG: 10 INJECTION INTRAVENOUS at 17:53

## 2023-01-01 RX ADMIN — LORAZEPAM 1 MG: 2 INJECTION INTRAMUSCULAR; INTRAVENOUS at 22:27

## 2023-01-01 RX ADMIN — MORPHINE SULFATE 5 MG: 10 INJECTION INTRAVENOUS at 07:36

## 2023-01-01 RX ADMIN — Medication 3 ML: at 19:23

## 2023-01-01 RX ADMIN — LORAZEPAM 1 MG: 2 INJECTION INTRAMUSCULAR; INTRAVENOUS at 00:57

## 2023-01-01 RX ADMIN — LORAZEPAM 1 MG: 2 INJECTION INTRAMUSCULAR; INTRAVENOUS at 16:17

## 2023-01-01 RX ADMIN — MORPHINE SULFATE 5 MG: 10 INJECTION INTRAVENOUS at 17:15

## 2023-01-01 RX ADMIN — MORPHINE SULFATE 5 MG: 10 INJECTION INTRAVENOUS at 10:23

## 2023-01-01 RX ADMIN — LORAZEPAM 1 MG: 2 INJECTION INTRAMUSCULAR; INTRAVENOUS at 06:07

## 2023-01-01 RX ADMIN — MORPHINE SULFATE 5 MG: 10 INJECTION INTRAVENOUS at 04:57

## 2023-01-01 RX ADMIN — IOHEXOL 80 ML: 350 INJECTION, SOLUTION INTRAVENOUS at 17:30

## 2023-01-01 RX ADMIN — LORAZEPAM 1 MG: 2 INJECTION INTRAMUSCULAR; INTRAVENOUS at 17:53

## 2023-01-01 RX ADMIN — AMPICILLIN AND SULBACTAM 3 G: 1; 2 INJECTION, POWDER, FOR SOLUTION INTRAMUSCULAR; INTRAVENOUS at 21:48

## 2023-01-01 RX ADMIN — MORPHINE SULFATE 5 MG: 10 INJECTION INTRAVENOUS at 19:27

## 2023-01-01 RX ADMIN — MORPHINE SULFATE 5 MG: 10 INJECTION INTRAVENOUS at 21:51

## 2023-01-01 RX ADMIN — LORAZEPAM 1 MG: 2 INJECTION INTRAMUSCULAR; INTRAVENOUS at 09:55

## 2023-01-01 RX ADMIN — LORAZEPAM 1 MG: 2 INJECTION INTRAMUSCULAR; INTRAVENOUS at 12:13

## 2023-01-01 RX ADMIN — MORPHINE SULFATE 5 MG: 10 INJECTION INTRAVENOUS at 18:29

## 2023-01-01 RX ADMIN — MORPHINE SULFATE 5 MG: 10 INJECTION INTRAVENOUS at 04:01

## 2023-01-01 RX ADMIN — MORPHINE SULFATE 5 MG: 10 INJECTION INTRAVENOUS at 06:18

## 2023-01-01 RX ADMIN — MORPHINE SULFATE 5 MG: 10 INJECTION INTRAVENOUS at 13:07

## 2023-01-01 RX ADMIN — MORPHINE SULFATE 10 MG: 10 INJECTION INTRAVENOUS at 20:05

## 2023-01-01 RX ADMIN — MORPHINE SULFATE 4 MG: 4 INJECTION, SOLUTION INTRAMUSCULAR; INTRAVENOUS at 19:23

## 2023-01-01 RX ADMIN — LORAZEPAM 1 MG: 2 INJECTION INTRAMUSCULAR; INTRAVENOUS at 02:10

## 2023-01-01 RX ADMIN — LORAZEPAM 1 MG: 2 INJECTION INTRAMUSCULAR; INTRAVENOUS at 16:03

## 2023-01-01 RX ADMIN — LORAZEPAM 1 MG: 2 INJECTION INTRAMUSCULAR; INTRAVENOUS at 20:29

## 2023-01-01 RX ADMIN — MORPHINE SULFATE 5 MG: 10 INJECTION INTRAVENOUS at 08:48

## 2023-01-01 RX ADMIN — MORPHINE SULFATE 10 MG: 10 INJECTION INTRAVENOUS at 16:17

## 2023-01-01 RX ADMIN — SODIUM CHLORIDE, POTASSIUM CHLORIDE, SODIUM LACTATE AND CALCIUM CHLORIDE: 600; 310; 30; 20 INJECTION, SOLUTION INTRAVENOUS at 22:02

## 2023-01-01 RX ADMIN — OXYCODONE HYDROCHLORIDE 5 MG: 5 TABLET ORAL at 22:01

## 2023-01-01 RX ADMIN — MORPHINE SULFATE 5 MG: 10 INJECTION INTRAVENOUS at 06:06

## 2023-01-01 RX ADMIN — LORAZEPAM 1 MG: 2 INJECTION INTRAMUSCULAR; INTRAVENOUS at 15:43

## 2023-01-01 RX ADMIN — MORPHINE SULFATE 5 MG: 10 INJECTION INTRAVENOUS at 13:24

## 2023-01-01 RX ADMIN — MORPHINE SULFATE 5 MG: 10 INJECTION INTRAVENOUS at 20:56

## 2023-01-01 RX ADMIN — MORPHINE SULFATE 5 MG: 10 INJECTION INTRAVENOUS at 17:25

## 2023-01-01 RX ADMIN — MORPHINE SULFATE 5 MG: 10 INJECTION INTRAVENOUS at 08:02

## 2023-01-01 RX ADMIN — MORPHINE SULFATE 5 MG: 10 INJECTION INTRAVENOUS at 00:58

## 2023-01-01 RX ADMIN — LORAZEPAM 1 MG: 2 INJECTION INTRAMUSCULAR; INTRAVENOUS at 22:52

## 2023-01-01 RX ADMIN — MORPHINE SULFATE 5 MG: 10 INJECTION INTRAVENOUS at 20:31

## 2023-01-01 RX ADMIN — MORPHINE SULFATE 5 MG: 10 INJECTION INTRAVENOUS at 15:52

## 2023-01-01 RX ADMIN — MORPHINE SULFATE 5 MG: 10 INJECTION INTRAVENOUS at 05:54

## 2023-01-01 RX ADMIN — LORAZEPAM 1 MG: 2 INJECTION INTRAMUSCULAR; INTRAVENOUS at 08:02

## 2023-01-01 RX ADMIN — MORPHINE SULFATE 5 MG: 10 INJECTION INTRAVENOUS at 22:51

## 2023-01-01 RX ADMIN — LORAZEPAM 1 MG: 2 INJECTION INTRAMUSCULAR; INTRAVENOUS at 18:29

## 2023-01-01 RX ADMIN — LORAZEPAM 1 MG: 2 INJECTION INTRAMUSCULAR; INTRAVENOUS at 13:54

## 2023-01-01 RX ADMIN — LORAZEPAM 1 MG: 2 INJECTION INTRAMUSCULAR; INTRAVENOUS at 13:08

## 2023-01-01 RX ADMIN — SODIUM CHLORIDE 1000 ML: 9 INJECTION, SOLUTION INTRAVENOUS at 19:24

## 2023-01-01 RX ADMIN — LORAZEPAM 1 MG: 2 INJECTION INTRAMUSCULAR; INTRAVENOUS at 17:23

## 2023-01-01 RX ADMIN — ACETAMINOPHEN 1000 MG: 500 TABLET, FILM COATED ORAL at 22:01

## 2023-01-01 RX ADMIN — LORAZEPAM 1 MG: 2 INJECTION INTRAMUSCULAR; INTRAVENOUS at 11:04

## 2023-01-01 RX ADMIN — MORPHINE SULFATE 10 MG: 10 INJECTION INTRAVENOUS at 11:04

## 2023-01-01 RX ADMIN — MORPHINE SULFATE 5 MG: 10 INJECTION INTRAVENOUS at 15:42

## 2023-01-01 RX ADMIN — LORAZEPAM 1 MG: 2 INJECTION INTRAMUSCULAR; INTRAVENOUS at 04:02

## 2023-01-01 RX ADMIN — MORPHINE SULFATE 5 MG: 10 INJECTION INTRAVENOUS at 23:58

## 2023-01-01 RX ADMIN — MORPHINE SULFATE 5 MG: 10 INJECTION INTRAVENOUS at 02:55

## 2023-01-01 RX ADMIN — MORPHINE SULFATE 10 MG: 10 INJECTION INTRAVENOUS at 09:55

## 2023-01-01 RX ADMIN — LORAZEPAM 1 MG: 2 INJECTION INTRAMUSCULAR; INTRAVENOUS at 07:36

## 2023-01-01 RX ADMIN — MORPHINE SULFATE 5 MG: 10 INJECTION INTRAVENOUS at 01:23

## 2023-01-01 RX ADMIN — LORAZEPAM 1 MG: 2 INJECTION INTRAMUSCULAR; INTRAVENOUS at 02:55

## 2023-01-01 RX ADMIN — MORPHINE SULFATE 10 MG: 10 INJECTION INTRAVENOUS at 13:54

## 2023-01-01 RX ADMIN — LORAZEPAM 1 MG: 2 INJECTION INTRAMUSCULAR; INTRAVENOUS at 23:59

## 2023-01-01 RX ADMIN — LORAZEPAM 1 MG: 2 INJECTION INTRAMUSCULAR; INTRAVENOUS at 17:16

## 2023-01-01 RX ADMIN — LORAZEPAM 1 MG: 2 INJECTION INTRAMUSCULAR; INTRAVENOUS at 04:58

## 2023-01-01 RX ADMIN — LORAZEPAM 1 MG: 2 INJECTION INTRAMUSCULAR; INTRAVENOUS at 20:05

## 2023-01-01 RX ADMIN — LORAZEPAM 1 MG: 2 INJECTION INTRAMUSCULAR; INTRAVENOUS at 18:32

## 2023-01-01 RX ADMIN — SODIUM CHLORIDE, POTASSIUM CHLORIDE, SODIUM LACTATE AND CALCIUM CHLORIDE 1000 ML: 600; 310; 30; 20 INJECTION, SOLUTION INTRAVENOUS at 03:20

## 2023-01-01 RX ADMIN — MORPHINE SULFATE 5 MG: 10 INJECTION INTRAVENOUS at 02:10

## 2023-01-01 RX ADMIN — MORPHINE SULFATE 5 MG: 10 INJECTION INTRAVENOUS at 14:18

## 2023-01-01 ASSESSMENT — PAIN DESCRIPTION - PAIN TYPE
TYPE: ACUTE PAIN
TYPE: CHRONIC PAIN
TYPE: ACUTE PAIN
TYPE: ACUTE PAIN
TYPE: CHRONIC PAIN
TYPE: ACUTE PAIN

## 2023-01-01 ASSESSMENT — COGNITIVE AND FUNCTIONAL STATUS - GENERAL
DAILY ACTIVITIY SCORE: 9
PERSONAL GROOMING: A LOT
SUGGESTED CMS G CODE MODIFIER MOBILITY: CL
MOBILITY SCORE: 10
SUGGESTED CMS G CODE MODIFIER DAILY ACTIVITY: CL
DRESSING REGULAR UPPER BODY CLOTHING: TOTAL
DRESSING REGULAR LOWER BODY CLOTHING: A LOT
WALKING IN HOSPITAL ROOM: TOTAL
EATING MEALS: A LOT
TURNING FROM BACK TO SIDE WHILE IN FLAT BAD: A LOT
STANDING UP FROM CHAIR USING ARMS: A LOT
CLIMB 3 TO 5 STEPS WITH RAILING: TOTAL
MOVING FROM LYING ON BACK TO SITTING ON SIDE OF FLAT BED: A LOT
MOVING TO AND FROM BED TO CHAIR: A LOT
HELP NEEDED FOR BATHING: TOTAL
TOILETING: TOTAL

## 2023-01-01 ASSESSMENT — ENCOUNTER SYMPTOMS
BACK PAIN: 1
WEAKNESS: 1
RESPIRATORY NEGATIVE: 1
PSYCHIATRIC NEGATIVE: 1
EYES NEGATIVE: 1
CARDIOVASCULAR NEGATIVE: 1
FALLS: 1
GASTROINTESTINAL NEGATIVE: 1

## 2023-01-01 ASSESSMENT — PATIENT HEALTH QUESTIONNAIRE - PHQ9
2. FEELING DOWN, DEPRESSED, IRRITABLE, OR HOPELESS: NOT AT ALL
SUM OF ALL RESPONSES TO PHQ9 QUESTIONS 1 AND 2: 0
1. LITTLE INTEREST OR PLEASURE IN DOING THINGS: NOT AT ALL

## 2023-01-01 ASSESSMENT — LIFESTYLE VARIABLES
CONSUMPTION TOTAL: NEGATIVE
HOW MANY TIMES IN THE PAST YEAR HAVE YOU HAD 5 OR MORE DRINKS IN A DAY: 0
HAVE YOU EVER FELT YOU SHOULD CUT DOWN ON YOUR DRINKING: NO
TOTAL SCORE: 0
ALCOHOL_USE: NO
TOTAL SCORE: 0
AVERAGE NUMBER OF DAYS PER WEEK YOU HAVE A DRINK CONTAINING ALCOHOL: 0
DOES PATIENT WANT TO STOP DRINKING: CANNOT ASSESS
TOTAL SCORE: 0
ON A TYPICAL DAY WHEN YOU DRINK ALCOHOL HOW MANY DRINKS DO YOU HAVE: 0
EVER HAD A DRINK FIRST THING IN THE MORNING TO STEADY YOUR NERVES TO GET RID OF A HANGOVER: NO
EVER FELT BAD OR GUILTY ABOUT YOUR DRINKING: NO
HAVE PEOPLE ANNOYED YOU BY CRITICIZING YOUR DRINKING: NO

## 2023-01-01 ASSESSMENT — FIBROSIS 4 INDEX
FIB4 SCORE: 1.17
FIB4 SCORE: 6.48
FIB4 SCORE: 6.48
FIB4 SCORE: 1.17

## 2023-04-15 NOTE — ED NOTES
Pt resting on gurney. No acute distress. VSS. Pt states understanding of POC. No complaints/requests at this time. Call bell within reach.

## 2023-04-15 NOTE — ED PROVIDER NOTES
ER Provider Note    Scribed for Ambika Mason M.d. by Rancho Wright. 4/15/2023  1:56 PM    Primary Care Provider: Pcp Pt States None    CHIEF COMPLAINT  Chief Complaint   Patient presents with    GLF     Pt states she was walking in her apartment and tripped over her dog. Pt fell and hit her head on the stove. +loc, +head strike, + thinners (plavix)     EXTERNAL RECORDS REVIEWED  Other None    HPI/ROS  LIMITATION TO HISTORY   Select: : None  OUTSIDE HISTORIAN(S):  EMS reports history.    Yesika Aguilar is a 77 y.o. female with a history of falls who presents to the ED via EMS for a possible TBI. She states that she had tripped over her dog, fell and hit her head on the stove. There was loss of consciousness for a few minutes. Her friends found her, and noted she was acting slightly confused.S he is also on blood thinners. She has associated neck and head pain. She does have a history of neck pain. The pain she feels now is slightly increased. She occasionally uses a walker. There are no known alleviating or exacerbating factors.     PAST MEDICAL HISTORY  Past Medical History:   Diagnosis Date    Hypertension        SURGICAL HISTORY  History reviewed. No pertinent surgical history.    FAMILY HISTORY  History reviewed. No pertinent family history.    SOCIAL HISTORY   reports that she has never smoked. She has never used smokeless tobacco. She reports current alcohol use. She reports that she does not use drugs.    CURRENT MEDICATIONS  No current outpatient medications     ALLERGIES  Patient has no known allergies.    PHYSICAL EXAM  BP (!) 144/75   Pulse 66   Temp 36.7 °C (98 °F) (Temporal)   Resp 18   Ht 1.524 m (5')   Wt 45.4 kg (100 lb)   SpO2 99%   BMI 19.53 kg/m²   Constitutional: Alert in no apparent distress.  HENT: No signs of trauma, Bilateral external ears normal, Nose normal.   Eyes: Pupils are equal and reactive, Conjunctiva normal, Non-icteric.   Neck:  No stridor.  In c-collar  precautions  Thorax & Lungs: Nonlabored respirations  Abdomen: Bowel sounds normal, Soft, No tenderness, No masses, No peritoneal signs.  Skin: Warm, Dry, No erythema, No rash.   Musculoskeletal:  No major deformities noted.   Neurologic: Alert, moving all extremities without difficulty, no focal deficits.      DIAGNOSTIC STUDIES    Radiology:   The attending emergency physician has independently interpreted the diagnostic imaging associated with this visit and am waiting the final reading from the radiologist.   Preliminary interpretation is a follows: No head bleed  Radiologist interpretation:   CT-CSPINE WITHOUT PLUS RECONS   Final Result      No acute fracture or dislocation seen in the CT scan of the cervical spine.      CT-HEAD W/O   Final Result      1.  Diffuse atrophy and white matter changes.   2.  No acute intracranial hemorrhage or territorial infarct.               COURSE & MEDICAL DECISION MAKING     1:06 PM - Patient seen and examined at bedside. Discussed plan of care, including imaging. Patient agrees to the plan of care. Ordered for CT-Head w/o and CT-Spine w/out to evaluate her symptoms.      ED Observation Status? Yes; I am placing the patient in to an observation status due to a diagnostic uncertainty as well as therapeutic intensity. Patient placed in observation status at 2:10 PM, 4/15/2023.     Observation plan is as follows: Patient will undergo evaluation with imaging. Her condition will be closely monitored for any changes.     Upon Reevaluation, the patient's condition has: Improved; and will be discharged.    Patient discharged from ED Observation status at 2:27 PM 4/15/2023     INITIAL ASSESSMENT, COURSE AND PLAN  Care Narrative: This is a 77 y.o. Patient who presents for possible TBI. Head CT was negative, c spine CT was negative as well.  Patient was alert she was able to ambulate.  I do think she can be discharged at this time.  She is agreeable to this plan.  Given she is at her  baseline I do not think additional blood work or imaging is indicated at this time.    2:25 PM - I updated the patient at bedside. Discussed radiology results with the patient and informed them that they are reassuring.  Discussed discharge instructions and return precautions with the patient and they were cleared for discharge. Patient was given the opportunity to ask any further questions. She is comfortable with discharge at this time.           DISPOSITION AND DISCUSSIONS  I have discussed management of the patient with the following physicians and TRINIDAD's:  None     Discussion of management with other QHP or appropriate source(s): None     Escalation of care considered, and ultimately not performed: blood analysis.    Barriers to care at this time, including but not limited to: Patient does not have established PCP.     Decision tools and prescription drugs considered including, but not limited to:  None .    Patient will be discharged home.    FOLLOW UP:  [facility], Emergency Dept  [address] [place] [id number] [phone]    Return to the emergency department for worsening pain, nausea vomiting or other concerns.    FINAL DIANGOSIS  1. Closed head injury, initial encounter    2. Fall, initial encounter       IRancho (Scribmagdy), am scribing for, and in the presence of, Ambika Mason M.D..    Electronically signed by: Rancho Wright (Jessicaibmagdy), 4/15/2023    IAmbika M.D. personally performed the services described in this documentation, as scribed by Rancho Wright in my presence, and it is both accurate and complete.      The note accurately reflects work and decisions made by me.  Ambika Mason M.D.  4/15/2023  4:03 PM

## 2023-04-15 NOTE — ED NOTES
Pt AOx4 and ready for education. All discharge instructions given to pt. Pt verbalized understanding of all discharge instructions. All lines removed prior to discharge. All questions answered. Pt to lobby via ambulation.

## 2023-04-15 NOTE — ED TRIAGE NOTES
Chief Complaint   Patient presents with    GLF     Pt states she was walking in her apartment and tripped over her dog. Pt fell and hit her head on the stove. +loc, +head strike, + thinners (plavix)       Pt BIBA from home with the above complaint. Pt is GCS 15, in a c-collar by ems. Pt alerted as a TBI, seen and evaluated by ERP at charge desk and transported immediately to the scanner.     BP (!) 144/75   Pulse 66   Temp 36.7 °C (98 °F) (Temporal)   Resp 18   Ht 1.524 m (5')   Wt 45.4 kg (100 lb)   SpO2 99%   BMI 19.53 kg/m²

## 2023-08-13 PROBLEM — R79.89 ELEVATED TROPONIN: Status: ACTIVE | Noted: 2023-01-01

## 2023-08-13 PROBLEM — M62.82 RHABDOMYOLYSIS: Status: ACTIVE | Noted: 2023-01-01

## 2023-08-13 PROBLEM — D64.9 NORMOCYTIC ANEMIA: Status: ACTIVE | Noted: 2023-01-01

## 2023-08-13 PROBLEM — E87.20 LACTIC ACIDOSIS: Status: ACTIVE | Noted: 2023-01-01

## 2023-08-13 PROBLEM — N17.0 ACUTE RENAL FAILURE WITH TUBULAR NECROSIS (HCC): Status: ACTIVE | Noted: 2023-01-01

## 2023-08-13 PROBLEM — R79.89 LFT ELEVATION: Status: ACTIVE | Noted: 2023-01-01

## 2023-08-13 PROBLEM — S72.142A CLOSED 2-PART INTERTROCHANTERIC FRACTURE OF LEFT FEMUR (HCC): Status: ACTIVE | Noted: 2023-01-01

## 2023-08-13 PROBLEM — E87.1 HYPONATREMIA: Status: ACTIVE | Noted: 2023-01-01

## 2023-08-13 PROBLEM — U07.1 COVID: Status: ACTIVE | Noted: 2023-01-01

## 2023-08-13 PROBLEM — Z71.89 ADVANCE CARE PLANNING: Status: ACTIVE | Noted: 2023-01-01

## 2023-08-13 PROBLEM — J96.01 ACUTE RESPIRATORY FAILURE WITH HYPOXIA (HCC): Status: ACTIVE | Noted: 2023-01-01

## 2023-08-13 NOTE — ED TRIAGE NOTES
"Chief Complaint   Patient presents with    Fall     PATIENT HAS HAD MULTIPLE FALLS OVER THE PAST FEW DAYS. PATIENT YESTERDAY MORNING HAD A FALL THEN WENT TO SHOWER THE BLOOD OFF AND HAD A SYNCOPAL EPISODE. PT WAS DOWN IN BATHROOM SINCE 10:00 AM 8.12.23. PT WITH +HEAD STRIKE +THINNERS ( PRASUGUL). PT WITH MULTIPLE SYNCOPAL EPISODES WITH UNKNOWN ETIOLOGY. PT WITH LEFT HIP PAIN AS WELL..            PT ARRIVED VIA EMS. SEE ABOVE.    EMS GAVE 300ML BOLUS. INITIAL PRESSURE 80/58 LAST PRESSURE 111/71  BLOOD GLUCOSE 117    PT IN C-COLLAR UPON ARRIVAL.       Ht 1.6 m (5' 3\")   Wt 47.6 kg (105 lb)   BMI 18.60 kg/m²     "

## 2023-08-14 NOTE — CONSULTS
DATE OF SERVICE:  08/14/2023     ORTHOPEDIC CONSULTATION     REQUESTING PHYSICIAN:  Dr. Tye Finn, hospitalist service.     REASON FOR CONSULTATION:  Left proximal femur fracture.     CHIEF COMPLAINT:  Left hip pain.     HISTORY OF PRESENT ILLNESS:  The patient is 78 years old.  She was reportedly   found down in her bathroom, was seen at an outside facility and then directly   transferred to [facility] where she is being evaluated for   admission to the hospitalist service.  There is a concern for rhabdomyolysis   and acute kidney injury.  I was consulted to provide treatment recommendations   for her fracture.     PAST MEDICAL HISTORY:  ALLERGIES:  No known drug allergies.  OUTPATIENT MEDICATIONS:  Lisinopril, prasugrel, omeprazole.  PAST MEDICAL DIAGNOSIS:  Hypertension.  PAST SURGICAL HISTORY:  None listed.     SOCIAL HISTORY:  The patient denies tobacco, alcohol or illicit drug use.     PHYSICAL EXAMINATION:  VITAL SIGNS:  Temperature 97.7, heart rate 85, respiratory rate 18, blood   pressure 112/53, pulse oximetry 100% on 2 liters nasal cannula.  GENERAL APPEARANCE:  The patient is resting comfortably.  She wakes and   follows commands.  MUSCULOSKELETAL:  Left lower extremity is shortened and externally rotated.    She is able to dorsi and plantarflex the foot and flex and extend the toes.    She is nontender on palpation of the knee.  There is no knee effusion present.    There is no evidence of obvious traumatic deformity of the right lower or   bilateral upper extremities, which are grossly neurovascularly intact.     DIAGNOSTIC IMAGING:  Plain x-rays of the left femur and of the pelvis show   left proximal femur fracture consistent either with an intertrochanteric femur   fracture versus basicervical femoral neck fracture.  CT imaging confirms an  intertrochanteric femur fracture.     ASSESSMENT:   An 78-year-old female who was found down, has rhabdomyolysis,   acute kidney injury and  lactic acidosis.  She has a left comminuted, displaced  intertrochanteric femur fracture. She is being resuscitated by the   hospitalist service and has been evaluated for admission there.     RECOMMENDATIONS:  1.  I discussed these findings with the patient including treatment options.  We   discussed both nonoperative and surgical options.  I do recommend surgical   reduction and fixation with intramedullary nailing when otherwise clinically   appropriate.  She expressed understanding and a desire to proceed with surgery  when possible.  2.  Pending medical optimization, I will try to make preparations later today for   surgical fixation of her fracture, but if she is still felt needed further time for   optimization then we can perform surgical fixation later in the week.  3.  We did discuss this recommendation with the patient.  She is in favor of   proceeding with surgical management after discussing risks, benefits and   alternatives of surgery.  4.  Recommend she be n.p.o. and we will touch base with the hospitalist team   tomorrow and if she is medically optimized try to perform surgical fixation.    She should be at bed rest in the meantime and can have SCDs for DVT   prophylaxis.        ______________________________  MD MARIA EUGENIA Roth/GAIL/ANDREW    DD:  08/14/2023 01:45  DT:  08/14/2023 05:01    Job#:  [id number]

## 2023-08-14 NOTE — ED NOTES
Adult Mental Health Day Treatment  TRACK: 2a    PATIENT'S NAME: Natalio Rodas  MRN:   [id number]  :   1999  ACCT. NUMBER: [id number]  DATE OF SERVICE: 10/08/19  START TIME: 1000  END TIME: 1050    NUMBER OF PARTICIPANTS: 7      Summary of Group / Topics Discussed:  Medication Education and Management: Medication Jeopardy: Patients provided education regarding medication safety, antidepressants, side effects, neuroleptics, expected medication outcomes, knowledge of diagnosis, symptoms, and symptom management through an engaging jeopardy-style format.     Patient Session Goals / Objectives:    ? Participated in team-based [facility] game  ? Identified strategies for safe use, handling, and disposal of medications  ? Discussed basic aspects of medication safety, side effects, adverse outcomes and contraindications     Patient Participation / Response:  Fully participated with the group by sharing personal reflections / insights and openly received / provided feedback with other participants.     Demonstrated understanding of topics discussed through group discussion and participation    Treatment Plan:  Patient has a current master individualized treatment plan.  See Epic treatment plan for more information.             C-collar removed by ERP

## 2023-08-14 NOTE — PROGRESS NOTES
Patient arrived to [facility] accompanied by caryl Pugh.  Patient is comfort care, is resting in bed with eyes closed.  No signs of distress.  No needs at this time.

## 2023-08-14 NOTE — ED NOTES
APRN at bedside assessing the pt. It was determined that comfort care is the best route. SW notified to inform family. Grandson on his way. LR infusing at this time

## 2023-08-14 NOTE — ASSESSMENT & PLAN NOTE
Tested positive for COVID  Reported that had onset of immunization  Complicated by acute hypoxic respiratory failure  Chest CT clear    Continue comfort care measures only.

## 2023-08-14 NOTE — ASSESSMENT & PLAN NOTE
Likely secondary to fall and injury  CT imaging with normal hepatic and biliary system, history of cholecystectomy    Continue comfort care measures only.

## 2023-08-14 NOTE — ASSESSMENT & PLAN NOTE
Noted on x-ray and CT imaging  EDP discussed case with orthopedic surgery (Dr. Treivno), who evaluate case  Admit to telemetry      Continue comfort care measures only.

## 2023-08-14 NOTE — ED NOTES
PT comes to ER with dried feces and blood on entire body, PT provided bed bath + mell care. Pure wick, fresh gown and linens provided.

## 2023-08-14 NOTE — ASSESSMENT & PLAN NOTE
Traumatic rhabdomyolysis with CPK in the 1700s  Complicated by acute renal failure  Aggressive IV hydration  As needed Lasix if signs of fluid overload noted    Continue comfort care measures only.

## 2023-08-14 NOTE — PROGRESS NOTES
"  NOC APRN CROSS COVER NOTE      Contacted by primary RN for patient with blood pressure of 50s/30s.    Orders for stat 1 L LR bolus.    Patient was admitted yesterday evening following multiple ground-level falls and syncopal events.  Per review of chart POLST demonstrates comfort measures only however, patient has elected to undergo medical management during this hospitalization with a DNR/DNI status.    Upon arrival to bedside, patient is awake and remains alert and oriented.  I have discussed her dramatically low blood pressure as well as increased oxygen demand.  I have discussed the possibility of ICU level care.  At this time, patient is elected to be placed on comfort care.  Patient states \"if Jorge is calling me home, it is my time\".  Patient states \"my father  on his birthday. I was thinking that I might as well.\"  Social work has contacted her grandson who is returning to the hospital.      Comfort care orders placed in the computer.  Discussed plan of care with bedside RN and social work.      Gina Hoskins ACNPC-AG, NOC Hospitalist APRN        "

## 2023-08-14 NOTE — ED NOTES
The pt laying in bed with eyes closed. RN titrated down oxygen. Bp remains low, other vitals stable. No indicators of pain

## 2023-08-14 NOTE — ASSESSMENT & PLAN NOTE
Etiology unclear  Likely secondary to injury from rhabdomyolysis  Chest CT without reports of consolidation or atelectasis  No pyelonephritis or fat stranding noted on abdominal imaging    Continue comfort care measures only.

## 2023-08-14 NOTE — ASSESSMENT & PLAN NOTE
On 2 L nasal cannula  Secondary to possible aspiration and COVID-pneumonia  Reports chronic history of aspiration    Continue comfort care measures only.

## 2023-08-14 NOTE — ED NOTES
The pt is laying in bed with eyes closed. Breathing is even and unlabored. No indicators of pain noted. The pt arousable to voice and becomes oriented. Bp low, rechecked several times and found to be low. APRN notified and stated to give 1 L LR and will come down to reassess the pt. The pt moved to Trendelenburg position. Spo2 low, pt placed on NRB mask. Other vitals stable

## 2023-08-14 NOTE — ED NOTES
The pt laying in bed alert and oriented talking on phone with grandson present. No indicators of painn. BP remains low, other vitals stable.

## 2023-08-14 NOTE — PROGRESS NOTES
Hospital Medicine Daily Progress Note    Date of Service  8/14/2023    Chief Complaint  Yesika Aguilar is a 78 y.o. female admitted 8/13/2023 with fall    Hospital Course    77-year-old female with a past medical history of hypertension, GERD, reported history of open heart surgery, frequent falls, history of PAD status post stents presents emergency department on 8/13/2023 after being found on the ground in the bathroom.  Patient reported that she was feeling lightheaded and she went to kneel down on the bathtub to adjust for acid and lost consciousness.  Patient was on the ground and called EMS.  Patient denies change in position, chest pain, or dyspnea.  No hematochezia, melena, nausea vomiting or hematemesis.     At the emergency department, stable vitals.  Patient on 2 L nasal cannula.  CBC with leukocytosis at 13.7, elevated polys and normocytic anemia with hemoglobin at 9.8.  Chemistry with hyponatremia, high anion gap metabolic acidosis bicarbonate at 13, acute renal failure with creatinine at 1.89, BUN 59 elevated AST.  Lactic acid 3.1 and troponin 81.  CPK at 1,700.  Head CT with no acute intracranial abnormalities.  Maxillofacial CT without fractures.  No fractures noted on C and T-spine CT.  Chest/abdominal/pelvic CT showing left intertrochanteric femur fracture.  EDP discussed case with orthopedic surgery (Dr. Trevino) who will evaluate. Patient received a 2 L bolus and a dose of IV Rocephin. Patient was admitted for traumatic rhabdomyolysis causing acute renal failure, hyponatremia, and left femur fracture which requires urgent orthopedic surgery evaluation for possible surgical intervention and aggressive IV hydration and daily lab monitoring.     Patient was pressure started to trend down and hospitalist team evaluated this patient and after discussion transition her care to comfort care measures only.    Interval Problem Update  I evaluated her at the bedside.  Patient family member at the  bedside I discussed plan of care with him regarding hospice and comfort care and answered all of his questions.  Continue comfort care measures status only.    I have discussed this patient's plan of care and discharge plan at IDT rounds today with Case Management, Nursing, Nursing leadership, and other members of the IDT team.    Consultants/Specialty  orthopedics    Code Status  Comfort Care/DNR    Disposition  The patient is not medically cleared for discharge to home or a post-acute facility.  Anticipate discharge to: hospice    I have placed the appropriate orders for post-discharge needs.    Review of Systems  Review of Systems   Unable to perform ROS: Medical condition        Physical Exam  Temp:  [36.5 °C (97.7 °F)] 36.5 °C (97.7 °F)  Pulse:  [75-98] 92  Resp:  [16-20] 20  BP: ()/(27-78) 84/51  SpO2:  [67 %-100 %] 94 %    Physical Exam  I deferred physical exam as patient is on comfort care.  Fluids    Intake/Output Summary (Last 24 hours) at 8/14/2023 1446  Last data filed at 8/14/2023 0400  Gross per 24 hour   Intake 4100 ml   Output --   Net 4100 ml       Laboratory  Recent Labs     08/13/23 1717 08/14/23 0222   WBC 13.7* 12.0*   RBC 3.39* 2.71*   HEMOGLOBIN 9.8* 8.1*   HEMATOCRIT 30.2* 24.8*   MCV 89.1 91.5   MCH 28.9 29.9   MCHC 32.5 32.7   RDW 48.3 51.0*   PLATELETCT 192 182   MPV 10.2 10.0     Recent Labs     08/13/23 1717 08/14/23 0222   SODIUM 129* 133*   POTASSIUM 4.0 3.7   CHLORIDE 97 101   CO2 13* 16*   GLUCOSE 90 77   BUN 50* 46*   CREATININE 1.89* 1.40   CALCIUM 8.2* 7.7*                   Imaging  DX-FEMUR-2+ LEFT   Final Result            Acute comminuted and displaced left intertrochanteric fracture.      DX-PELVIS-1 OR 2 VIEWS   Final Result         Acute comminuted and displaced left intertrochanteric fracture.      CT-LSPINE W/O PLUS RECONS   Final Result      No acute abnormality identified.      CT-CHEST,ABDOMEN,PELVIS WITH   Final Result      1.  Intratrochanteric LEFT femur  fracture   2.  No other acute traumatic injury in the chest, abdomen or pelvis   3.  Atherosclerosis   4.  4 cm ascending thoracic aortic aneurysm   5.  Prior cholecystectomy   6.  Prior hysterectomy   7.  12 mm hypodense LEFT lower pole renal lesion could be a proteinaceous or hemorrhagic cyst or a small renal mass. This should be amenable to further assessment with ultrasound when clinically appropriate.      CT-TSPINE W/O PLUS RECONS   Final Result         1. No acute fracture or malalignment appreciated in the thoracic spine      2. Likely ankylosing spondylitis.         CT-CSPINE WITHOUT PLUS RECONS   Final Result      No acute abnormality identified.      CT-MAXILLOFACIAL W/O PLUS RECONS   Final Result         No acute maxillofacial fracture.      CT-HEAD W/O   Final Result         1. No acute intracranial abnormality. No evidence of acute intracranial hemorrhage or mass lesion.                          Assessment/Plan  * Rhabdomyolysis- (present on admission)  Assessment & Plan  Traumatic rhabdomyolysis with CPK in the 1700s  Complicated by acute renal failure  Aggressive IV hydration  As needed Lasix if signs of fluid overload noted    Continue comfort care measures only.    Advance care planning- (present on admission)  Assessment & Plan  I had extensive conversation with the patient and grandson at bedside.  Patient has POLST form that reports comfort care measures only and no x-ray, antibiotics or tube feeds.  Discussed with patient for which she reports that she wants to be DO NOT RESUSCITATE and DO NOT INTUBATE.  Patient is in disagreement with other measures and request to continue medical management.    Time spent providing coordinating care 32 minutes    COVID- (present on admission)  Assessment & Plan  Tested positive for COVID  Reported that had onset of immunization  Complicated by acute hypoxic respiratory failure  Chest CT clear    Continue comfort care measures only.    Acute respiratory  failure with hypoxia (HCC)- (present on admission)  Assessment & Plan  On 2 L nasal cannula  Secondary to possible aspiration and COVID-pneumonia  Reports chronic history of aspiration    Continue comfort care measures only.    Elevated troponin- (present on admission)  Assessment & Plan  Possibly secondary to trauma and acute renal failure    \Continue comfort care measures only.    Closed 2-part intertrochanteric fracture of left femur (HCC)- (present on admission)  Assessment & Plan  Noted on x-ray and CT imaging  EDP discussed case with orthopedic surgery (Dr. Trevino), who evaluate case  Admit to telemetry      Continue comfort care measures only.    Normocytic anemia- (present on admission)  Assessment & Plan  Pending iron studies  Pending B12    Continue comfort care measures only.    LFT elevation- (present on admission)  Assessment & Plan  Likely secondary to fall and injury  CT imaging with normal hepatic and biliary system, history of cholecystectomy    Continue comfort care measures only.    Lactic acidosis- (present on admission)  Assessment & Plan  Etiology unclear  Likely secondary to injury from rhabdomyolysis  Chest CT without reports of consolidation or atelectasis  No pyelonephritis or fat stranding noted on abdominal imaging    Continue comfort care measures only.    Hyponatremia- (present on admission)  Assessment & Plan  Secondary to dehydration  IV hydration  Labs in a.m.    Acute renal failure with tubular necrosis (HCC)- (present on admission)  Assessment & Plan  Secondary to rhabdomyolysis  Abdominal/pelvic CT without signs of hydronephrosis or obstruction  Aggressive IV hydration  Monitor for signs of fluid overload    Continue comfort care measures only.         VTE prophylaxis: Patient is on comfort care discontinued DVT prophylaxis.    I have performed a physical exam and reviewed and updated ROS and Plan today (8/14/2023). In review of yesterday's note (8/13/2023), there are no changes  except as documented above.

## 2023-08-14 NOTE — ED NOTES
Continue to attempt to cleanse PT scalp of dried blood. PT provided multiple warm blankets + mouth swabs throughout process. NAD.

## 2023-08-14 NOTE — DISCHARGE PLANNING
Medical Social Work    MSW received a call from bedside RN requesting this worker reach out to pt's grandson to return to the hospital as pt is declining.  MSW contacted pt's grandsonKeaton ([phone]) via phone and he will return to the hospital.  [facility] notified and they will send family back when they arrive.  Bedside RN updated.

## 2023-08-14 NOTE — ED PROVIDER NOTES
"ED Provider Note    CHIEF COMPLAINT  Chief Complaint   Patient presents with    Fall     PATIENT HAS HAD MULTIPLE FALLS OVER THE PAST FEW DAYS. PATIENT YESTERDAY MORNING HAD A FALL THEN WENT TO SHOWER THE BLOOD OFF AND HAD A SYNCOPAL EPISODE. PT WAS DOWN IN BATHROOM SINCE 10:00 AM 8.12.23. PT WITH +HEAD STRIKE +THINNERS ( PRASUGUL). PT WITH MULTIPLE SYNCOPAL EPISODES WITH UNKNOWN ETIOLOGY. PT WITH LEFT HIP PAIN AS WELL..        EXTERNAL RECORDS REVIEWED  EMS run sheet      HPI/ROS  LIMITATION TO HISTORY     OUTSIDE HISTORIAN(S):  EMS      Yesika Aguilar is a 77 y.o. female who presents after being found down.  Patient was found by EMS on the ground in her apartment, having not been seen by family for over 24 hours.  Patient says that she tripped and fell and could not get up off the ground.  Patient denies other recent illness including fever, chills, cough, vomiting or diarrhea.    PAST MEDICAL HISTORY   has a past medical history of Hypertension.    SURGICAL HISTORY  patient denies any surgical history    FAMILY HISTORY  History reviewed. No pertinent family history.    SOCIAL HISTORY  Social History     Tobacco Use    Smoking status: Never    Smokeless tobacco: Never   Vaping Use    Vaping Use: Never used   Substance and Sexual Activity    Alcohol use: Yes     Comment: rarely    Drug use: Never    Sexual activity: Not on file       CURRENT MEDICATIONS  Home Medications       Reviewed by Clair Juarez (Pharmacy Tech) on 08/13/23 at 2046  Med List Status: Complete     Medication Last Dose Status   lisinopril (PRINIVIL) 20 MG Tab UNKN Active   omeprazole (PRILOSEC) 40 MG delayed-release capsule UNKN Active   Prasugrel HCl 5 MG Tab UNKN Active                    ALLERGIES  No Known Allergies    PHYSICAL EXAM  VITAL SIGNS: /78   Pulse 82   Temp 36.5 °C (97.7 °F) (Temporal)   Resp 16   Ht 1.6 m (5' 3\")   Wt 47.6 kg (105 lb)   SpO2 100%   BMI 18.60 kg/m²    Chronically ill-appearing, 7 cm " laceration across left frontal scalp, no crepitus, facial bones intact, clear bilateral breath sounds, normal heart sounds, abdomen soft nontender, pelvis stable, multiple areas of ecchymosis across thorax and extremities no clear deformity    DIAGNOSTIC STUDIES / PROCEDURES  EKG  I have independently interpreted this EKG  Results for orders placed or performed during the hospital encounter of 23   EKG (NOW)   Result Value Ref Range    Report       [facility] Emergency Dept.    Test Date:  2023  Pt Name:    NORTH CATES              Department: ER  MRN:        [id number]                      Room:        08  Gender:     Female                       Technician: [id number]  :        [id number]                   Requested By:AYDIN CORTEZ  Order #:    [id number]                    Reading MD:    Measurements  Intervals                                Axis  Rate:       87                           P:          78  ME:         172                          QRS:        72  QRSD:       88                           T:          225  QT:         390  QTc:        470    Interpretive Statements  Sinus rhythm  Repol abnrm, severe global ischemia (LM/MVD)  Compared to ECG 2022 10:59:30  Early repolarization now present  Possible ischemia now present  ST (T wave) deviation no longer present          LABS  Labs Reviewed   CBC WITH DIFFERENTIAL - Abnormal; Notable for the following components:       Result Value    WBC 13.7 (*)     RBC 3.39 (*)     Hemoglobin 9.8 (*)     Hematocrit 30.2 (*)     Neutrophils-Polys 85.30 (*)     Lymphocytes 4.20 (*)     Neutrophils (Absolute) 11.73 (*)     Lymphs (Absolute) 0.57 (*)     Monos (Absolute) 1.33 (*)     All other components within normal limits    Narrative:     Biotin intake of greater than 5 mg per day may interfere with  troponin levels, causing false low values.   COMP METABOLIC PANEL - Abnormal; Notable for the following components:    Sodium  129 (*)     Co2 13 (*)     Anion Gap 19.0 (*)     Bun 50 (*)     Creatinine 1.89 (*)     Calcium 8.2 (*)     AST(SGOT) 98 (*)     Albumin 2.8 (*)     Total Protein 5.1 (*)     All other components within normal limits    Narrative:     Biotin intake of greater than 5 mg per day may interfere with  troponin levels, causing false low values.   LACTIC ACID - Abnormal; Notable for the following components:    Lactic Acid 3.1 (*)     All other components within normal limits    Narrative:     Biotin intake of greater than 5 mg per day may interfere with  troponin levels, causing false low values.   TROPONIN - Abnormal; Notable for the following components:    Troponin T 81 (*)     All other components within normal limits    Narrative:     Biotin intake of greater than 5 mg per day may interfere with  troponin levels, causing false low values.   URINALYSIS,CULTURE IF INDICATED - Abnormal; Notable for the following components:    Ketones 15 (*)     Protein 30 (*)     Occult Blood Small (*)     All other components within normal limits    Narrative:     Indication for culture:->Acute unexplained altered mental  status ONLY after ruling out other recognized cause   CREATINE KINASE - Abnormal; Notable for the following components:    CPK Total 1746 (*)     All other components within normal limits    Narrative:     Biotin intake of greater than 5 mg per day may interfere with  troponin levels, causing false low values.   COV-2, FLU A/B, AND RSV BY PCR (LABOMAR) - Abnormal; Notable for the following components:    SARS-CoV-2 by PCR DETECTED (*)     All other components within normal limits   ESTIMATED GFR - Abnormal; Notable for the following components:    GFR (CKD-EPI) 27 (*)     All other components within normal limits    Narrative:     Biotin intake of greater than 5 mg per day may interfere with  troponin levels, causing false low values.   TROPONIN - Abnormal; Notable for the following components:    Troponin T 87 (*)   "   All other components within normal limits    Narrative:     Biotin intake of greater than 5 mg per day may interfere with  troponin levels, causing false low values.   PHOSPHORUS - Abnormal; Notable for the following components:    Phosphorus 5.4 (*)     All other components within normal limits    Narrative:     Biotin intake of greater than 5 mg per day may interfere with  troponin levels, causing false low values.   IRON/TOTAL IRON BIND - Abnormal; Notable for the following components:    Iron 25 (*)     % Saturation 9 (*)     All other components within normal limits    Narrative:     Biotin intake of greater than 5 mg per day may interfere with  troponin levels, causing false low values.   RETICULOCYTES COUNT - Abnormal; Notable for the following components:    Imm. Reticulocyte Fraction 17.8 (*)     All other components within normal limits   URINE MICROSCOPIC (W/UA) - Abnormal; Notable for the following components:    RBC 2-5 (*)     Hyaline Cast 3-5 (*)     All other components within normal limits    Narrative:     Indication for culture:->Acute unexplained altered mental  status ONLY after ruling out other recognized cause   MAGNESIUM    Narrative:     Biotin intake of greater than 5 mg per day may interfere with  troponin levels, causing false low values.   VITAMIN B12   TSH WITH REFLEX TO FT4   FERRITIN   BLOOD CULTURE    Narrative:     Per Hospital Policy: Only change Specimen Src: to \"Line\" if  specified by physician order.   BLOOD CULTURE    Narrative:     Per Hospital Policy: Only change Specimen Src: to \"Line\" if  specified by physician order.   LACTIC ACID   PROCALCITONIN    Narrative:     Biotin intake of greater than 5 mg per day may interfere with  troponin levels, causing false low values.         RADIOLOGY  I have independently interpreted the diagnostic imaging associated with this visit and am waiting the final reading from the radiologist.   My preliminary interpretation is as follows: " No intracranial hemorrhage  Radiologist interpretation:   DX-FEMUR-2+ LEFT   Final Result            Acute comminuted and displaced left intertrochanteric fracture.      DX-PELVIS-1 OR 2 VIEWS   Final Result         Acute comminuted and displaced left intertrochanteric fracture.      CT-LSPINE W/O PLUS RECONS   Final Result      No acute abnormality identified.      CT-CHEST,ABDOMEN,PELVIS WITH   Final Result      1.  Intratrochanteric LEFT femur fracture   2.  No other acute traumatic injury in the chest, abdomen or pelvis   3.  Atherosclerosis   4.  4 cm ascending thoracic aortic aneurysm   5.  Prior cholecystectomy   6.  Prior hysterectomy   7.  12 mm hypodense LEFT lower pole renal lesion could be a proteinaceous or hemorrhagic cyst or a small renal mass. This should be amenable to further assessment with ultrasound when clinically appropriate.      CT-TSPINE W/O PLUS RECONS   Final Result         1. No acute fracture or malalignment appreciated in the thoracic spine      2. Likely ankylosing spondylitis.         CT-CSPINE WITHOUT PLUS RECONS   Final Result      No acute abnormality identified.      CT-MAXILLOFACIAL W/O PLUS RECONS   Final Result         No acute maxillofacial fracture.      CT-HEAD W/O   Final Result         1. No acute intracranial abnormality. No evidence of acute intracranial hemorrhage or mass lesion.                           COURSE & MEDICAL DECISION MAKING    ED Observation Status?     INITIAL ASSESSMENT, COURSE AND PLAN  Care Narrative: Differential includes traumatic brain injury, thoracic or abdominal trauma, rhabdomyolysis, sepsis.  Primary survey unremarkable.  Secondary survey notable for scalp laceration and multiple areas of ecchymosis.  Given patient's age and prolonged downtime plan for trauma pan scan as well as blood work to assess for metabolic derangements.    CT showing intertrochanteric fracture otherwise negative for injury.  Laceration was anesthetized, irrigated and  repaired as noted below.  Blood work notable for elevation in CK, intermediate range lactic acid.  Patient ordered for IV fluids, empiric antibiotics.  COVID testing positive.  Given patient's debilitated state, elevated lactic acid and CK plan for admission, spoke with hospitalist.  Spoke with surgery regarding patient's femur fracture.    LACERATION REPAIR PROCEDURE NOTE  The patient's identification was confirmed and consent was obtained.  This procedure was performed by Dr. Tye Finn at 2030.  Site: Left frontal scalp  Sterile procedures observed  Anesthetic used (type and amt): Let  Suture type/size: 5.0 nylon  Length: 7 cm  # of Sutures: 7  Technique: Simple interrupted  Complexity: Complex  Antibx ointment applied  Tetanus ordered  Site anesthetized, irrigated with NS, explored without evidence of foreign body, wound well approximated, site covered with dry, sterile dressing. Patient tolerated procedure well without complications. Instructions for care discussed verbally and patient provided with additional written instructions for homecare and f/u.            ADDITIONAL PROBLEM LIST    DISPOSITION AND DISCUSSIONS  I have discussed management of the patient with the following physicians and TRINIDAD's: Orthopedic surgery, hospitalist    Discussion of management with other QHP or appropriate source(s):      Escalation of care considered, and ultimately not performed:    Barriers to care at this time, including but not limited to: .     Decision tools and prescription drugs considered including, but not limited to: Antibiotics   .    FINAL DIAGNOSIS  1. Elevated lactic acid level    2. Closed displaced intertrochanteric fracture of left femur, initial encounter (HCC)           Electronically signed by: Caden Finn D.O., 8/13/2023 10:21 PM       Anxious

## 2023-08-14 NOTE — ASSESSMENT & PLAN NOTE
Secondary to rhabdomyolysis  Abdominal/pelvic CT without signs of hydronephrosis or obstruction  Aggressive IV hydration  Monitor for signs of fluid overload    Continue comfort care measures only.

## 2023-08-14 NOTE — ED NOTES
The pt requesting pain meds. The pt alert and oriented. RN to medicate per mar. The pt refusing prilosec. Bp low but other vitals stable. Grandson present at bedside

## 2023-08-14 NOTE — ASSESSMENT & PLAN NOTE
"As per previous hospitalist.    \"I had extensive conversation with the patient and grandson at bedside.  Patient has POLST form that reports comfort care measures only and no x-ray, antibiotics or tube feeds.  Discussed with patient for which she reports that she wants to be DO NOT RESUSCITATE and DO NOT INTUBATE.  Patient is in disagreement with other measures and request to continue medical management.\"    "

## 2023-08-14 NOTE — ED NOTES
"Med rec completed per patient, family at bedside, and home pharmacy, [facility]  Allergies reviewed    Patient was unable to recall what her medications were. When asked about last doses she states, \"I honestly dont remember.\" Per family at bedside, it was probably \"a couple days ago\", but they are unable to confirm.     "

## 2023-08-14 NOTE — H&P
Hospital Medicine History & Physical Note    Date of Service  8/13/2023    Primary Care Physician  Pcp Pt States None    Consultants  Orthopedic surgery    Code Status  DNAR/DNI    Chief Complaint  Chief Complaint   Patient presents with    Fall     PATIENT HAS HAD MULTIPLE FALLS OVER THE PAST FEW DAYS. PATIENT YESTERDAY MORNING HAD A FALL THEN WENT TO SHOWER THE BLOOD OFF AND HAD A SYNCOPAL EPISODE. PT WAS DOWN IN BATHROOM SINCE 10:00 AM 8.12.23. PT WITH +HEAD STRIKE +THINNERS ( PRASUGUL). PT WITH MULTIPLE SYNCOPAL EPISODES WITH UNKNOWN ETIOLOGY. PT WITH LEFT HIP PAIN AS WELL..        History of Presenting Illness  77-year-old female with a past medical history of hypertension, GERD, reported history of open heart surgery, frequent falls, history of PAD status post stents presents emergency department on 8/13/2023 after being found on the ground in the bathroom.  Patient reported that she was feeling lightheaded and she went to kneel down on the bathtub to adjust for acid and lost consciousness.  Patient was on the ground and called EMS.  Patient denies change in position, chest pain, or dyspnea.  No hematochezia, melena, nausea vomiting or hematemesis.    At the emergency department, stable vitals.  Patient on 2 L nasal cannula.  CBC with leukocytosis at 13.7, elevated polys and normocytic anemia with hemoglobin at 9.8.  Chemistry with hyponatremia, high anion gap metabolic acidosis bicarbonate at 13, acute renal failure with creatinine at 1.89, BUN 59 elevated AST.  Lactic acid 3.1 and troponin 81.  CPK at 1,700.  Head CT with no acute intracranial abnormalities.  Maxillofacial CT without fractures.  No fractures noted on C and T-spine CT.  Chest/abdominal/pelvic CT showing left intertrochanteric femur fracture.  EDP discussed case with orthopedic surgery (Dr. Trevino) who will evaluate. Patient received a 2 L bolus and a dose of IV Rocephin. Patient was admitted for traumatic rhabdomyolysis causing acute renal  failure, hyponatremia, and left femur fracture which requires urgent orthopedic surgery evaluation for possible surgical intervention and aggressive IV hydration and daily lab monitoring.    I had extensive conversation with the patient and grandson at bedside.  Patient has POLST form that reports comfort care measures only and no x-ray, antibiotics or tube feeds.  Discussed with patient for which she reports that she wants to be DO NOT RESUSCITATE and DO NOT INTUBATE.  Patient is in disagreement with other measures and request to continue medical management.    I discussed the plan of care with patient and bedside RN.    Review of Systems  Review of Systems   Constitutional:  Positive for malaise/fatigue.   HENT: Negative.     Eyes: Negative.    Respiratory: Negative.     Cardiovascular: Negative.    Gastrointestinal: Negative.    Genitourinary: Negative.    Musculoskeletal:  Positive for back pain, falls and joint pain.   Skin: Negative.    Neurological:  Positive for weakness.   Endo/Heme/Allergies: Negative.    Psychiatric/Behavioral: Negative.         Past Medical History   has a past medical history of Hypertension.    Surgical History  Open heart surgery    Family History  Family history reviewed with patient. There is no family history that is pertinent to the chief complaint.     Social History  Denies tobacco use or alcohol use    Allergies  No Known Allergies    Medications  None       Physical Exam  Temp:  [36.5 °C (97.7 °F)] 36.5 °C (97.7 °F)  Pulse:  [86-91] 91  Resp:  [16] 16  BP: (111-133)/(52-71) 133/71  SpO2:  [94 %-97 %] 94 %  Blood Pressure : 133/71   Temperature: 36.5 °C (97.7 °F)   Pulse: 91   Respiration: 16   Pulse Oximetry: 94 %       Physical Exam  Constitutional:       Appearance: Normal appearance.   HENT:      Head: Normocephalic and atraumatic.      Mouth/Throat:      Mouth: Mucous membranes are moist.   Eyes:      Extraocular Movements: Extraocular movements intact.      Pupils: Pupils  are equal, round, and reactive to light.   Cardiovascular:      Rate and Rhythm: Normal rate and regular rhythm.      Pulses: Normal pulses.      Heart sounds: Normal heart sounds.   Pulmonary:      Effort: Pulmonary effort is normal.      Breath sounds: Normal breath sounds.   Abdominal:      General: Bowel sounds are normal.      Palpations: Abdomen is soft.   Musculoskeletal:         General: No swelling. Normal range of motion.      Cervical back: Normal range of motion and neck supple.   Skin:     General: Skin is warm.      Coloration: Skin is not jaundiced.   Neurological:      General: No focal deficit present.      Mental Status: She is alert and oriented to person, place, and time. Mental status is at baseline.      Cranial Nerves: No cranial nerve deficit.   Psychiatric:         Mood and Affect: Mood normal.         Behavior: Behavior normal.         Thought Content: Thought content normal.         Judgment: Judgment normal.         Laboratory:  Recent Labs     08/13/23  1717   WBC 13.7*   RBC 3.39*   HEMOGLOBIN 9.8*   HEMATOCRIT 30.2*   MCV 89.1   MCH 28.9   MCHC 32.5   RDW 48.3   PLATELETCT 192   MPV 10.2     Recent Labs     08/13/23  1717   SODIUM 129*   POTASSIUM 4.0   CHLORIDE 97   CO2 13*   GLUCOSE 90   BUN 50*   CREATININE 1.89*   CALCIUM 8.2*     Recent Labs     08/13/23  1717   ALTSGPT 34   ASTSGOT 98*   ALKPHOSPHAT 47   TBILIRUBIN 0.4   GLUCOSE 90         No results for input(s): NTPROBNP in the last 72 hours.      Recent Labs     08/13/23  1717   TROPONINT 81*       Imaging:  DX-FEMUR-2+ LEFT   Final Result            Acute comminuted and displaced left intertrochanteric fracture.      DX-PELVIS-1 OR 2 VIEWS   Final Result         Acute comminuted and displaced left intertrochanteric fracture.      CT-LSPINE W/O PLUS RECONS   Final Result      No acute abnormality identified.      CT-CHEST,ABDOMEN,PELVIS WITH   Final Result      1.  Intratrochanteric LEFT femur fracture   2.  No other acute  traumatic injury in the chest, abdomen or pelvis   3.  Atherosclerosis   4.  4 cm ascending thoracic aortic aneurysm   5.  Prior cholecystectomy   6.  Prior hysterectomy   7.  12 mm hypodense LEFT lower pole renal lesion could be a proteinaceous or hemorrhagic cyst or a small renal mass. This should be amenable to further assessment with ultrasound when clinically appropriate.      CT-TSPINE W/O PLUS RECONS   Final Result         1. No acute fracture or malalignment appreciated in the thoracic spine      2. Likely ankylosing spondylitis.         CT-CSPINE WITHOUT PLUS RECONS   Final Result      No acute abnormality identified.      CT-MAXILLOFACIAL W/O PLUS RECONS   Final Result         No acute maxillofacial fracture.      CT-HEAD W/O   Final Result         1. No acute intracranial abnormality. No evidence of acute intracranial hemorrhage or mass lesion.                       Assessment/Plan:  Justification for Admission Status  I anticipate this patient will require at least two midnights for appropriate medical management, necessitating inpatient admission because of below    * Rhabdomyolysis- (present on admission)  Assessment & Plan  Traumatic rhabdomyolysis with CPK in the 1700s  Complicated by acute renal failure  Aggressive IV hydration  As needed Lasix if signs of fluid overload noted  Labs on a.m.    Acute respiratory failure with hypoxia (HCC)- (present on admission)  Assessment & Plan  On 2 L nasal cannula  Secondary to possible aspiration and COVID-pneumonia  Reports chronic history of aspiration  Keep n.p.o. for now  SPO2 evaluate  IV antibiotics and IV Decadron  IV hydration for rhabdomyolysis  As needed Lasix  Titrate oxygen stable    Closed 2-part intertrochanteric fracture of left femur (HCC)- (present on admission)  Assessment & Plan  Noted on x-ray and CT imaging  EDP discussed case with orthopedic surgery (Dr. Trevino), who evaluate case  Admit to telemetry  Bed rest for now  Pain  management  Hold off on diet and prophylactic DVT chemical anticoagulation pending orthopedic surgery evaluation  Orthopedic surgery following, appreciate recommendation    Lactic acidosis- (present on admission)  Assessment & Plan  Etiology unclear  Likely secondary to injury from rhabdomyolysis  Chest CT without reports of consolidation or atelectasis  No pyelonephritis or fat stranding noted on abdominal imaging  Received IV antibiotic at ED  IV hydration  Labs in a.m.    Acute renal failure with tubular necrosis (HCC)- (present on admission)  Assessment & Plan  Secondary to rhabdomyolysis  Abdominal/pelvic CT without signs of hydronephrosis or obstruction  Aggressive IV hydration  Monitor for signs of fluid overload  Avoid nephrotoxins  Renal dose meds  Labs on a.m.    Advance care planning- (present on admission)  Assessment & Plan  I had extensive conversation with the patient and grandson at bedside.  Patient has POLST form that reports comfort care measures only and no x-ray, antibiotics or tube feeds.  Discussed with patient for which she reports that she wants to be DO NOT RESUSCITATE and DO NOT INTUBATE.  Patient is in disagreement with other measures and request to continue medical management.    Time spent providing coordinating care 32 minutes    COVID- (present on admission)  Assessment & Plan  Tested positive for COVID  Reported that had onset of immunization  Complicated by acute hypoxic respiratory failure  Chest CT clear  IV steroids  Monitor    Elevated troponin- (present on admission)  Assessment & Plan  Possibly secondary to trauma and acute renal failure  Telemetry monitoring  Trend troponin    Normocytic anemia- (present on admission)  Assessment & Plan  Pending iron studies  Pending B12  Labs on a.m.    LFT elevation- (present on admission)  Assessment & Plan  Likely secondary to fall and injury  CT imaging with normal hepatic and biliary system, history of cholecystectomy  IV  hydration  Labs in a.m.    Hyponatremia- (present on admission)  Assessment & Plan  Secondary to dehydration  IV hydration  Labs in a.m.      VTE prophylaxis: SCDs/TEDs

## 2023-08-14 NOTE — PROGRESS NOTES
4 Eyes Skin Assessment Completed by DIANA Babin and DIANA Dunham.    Head WDL  Ears WDL  Nose WDL  Mouth WDL  Neck WDL  Breast/Chest WDL  Shoulder Blades WDL  Spine Redness and Blanching  (R) Arm/Elbow/Hand WDL  (L) Arm/Elbow/Hand WDL  Abdomen WDL  Groin WDL  Scrotum/Coccyx/Buttocks WDL  (R) Leg WDL  (L) Leg WDL  (R) Heel/Foot/Toe WDL  (L) Heel/Foot/Toe Redness and Blanching          Devices In Places Garcia      Interventions In Place Heel Mepilex, Waffle Overlay, and Pillows    Possible Skin Injury No    Pictures Uploaded Into Epic N/A  Wound Consult Placed N/A  RN Wound Prevention Protocol Ordered No

## 2023-08-15 PROBLEM — Z51.5 COMFORT MEASURES ONLY STATUS: Status: ACTIVE | Noted: 2023-01-01

## 2023-08-15 NOTE — CARE PLAN
The patient is Unstable - High likelihood or risk of patient condition declining or worsening         Progress made toward(s) clinical / shift goals:      Patient is not progressing towards the following goals:      Problem: Pain - Standard  Goal: Alleviation of pain or a reduction in pain to the patient’s comfort goal  Outcome: Progressing  Flowsheets (Taken 8/14/2023 1707)  Non Verbal Scale:   Calm   Sleeping  OB Pain Intervention: Medication - See MAR  Pain Rating Scale (NPRS): 0  Note: The patient was complaining of left leg pain upon arrival to [facility]. The patient was given morphine PRN and upon the recheck 30 minutes later after the pain medication administration the patient was found sleeping comfortably in her bed.     Problem: Fall Risk  Goal: Patient will remain free from falls  Outcome: Progressing  Note: The patient has fall risk precautions in place. The patient has a fall risk wristband on as well as a fall risk sign on the outside of the door. The patient has her daughter and grandson at bedside at all times. The patients bed alarm is plugged in and on.

## 2023-08-15 NOTE — PROGRESS NOTES
Family requested cot and mouth swabs. Cot ordered in service task and moth swabs dropped off in room

## 2023-08-15 NOTE — HOSPICE
[facility] referral/consult response    Is this patient accepted to [facility]?: NO  What hospice level of care?:GIP  Approved by provider: Rosalva TORREZ    Patient doesn't qualify for GIP as she is currently comfort on her current medication regimen. Patient is imminent with periods of Apnea

## 2023-08-15 NOTE — DISCHARGE PLANNING
Received Choice form @: [id number]  Agency/Facility Name: [facility]  Referral sent per Choice form @: Not sent, waiting on order.

## 2023-08-15 NOTE — CARE PLAN
The patient is Watcher - Medium risk of patient condition declining or worsening    Shift Goals  Clinical Goals: Comfort Care  Patient Goals: Comfort Care  Family Goals: Comfort Care    Progress made toward(s) clinical / shift goals:        Problem: Knowledge Deficit - Standard  Goal: Patient and family/care givers will demonstrate understanding of plan of care, disease process/condition, diagnostic tests and medications  Outcome: Progressing  Note: Family at bedside. Cot setup with bedding. Oral care/mouth moisture provided. Encouraged daughter to alert me to any s/sx of discomfort prior to q 2 hrs turns and checks.        Problem: Pain - Standard  Goal: Alleviation of pain or a reduction in pain to the patient’s comfort goal  Outcome: Progressing  Note: Pain and anxiety treated with Morphine 5-10 mg; Ativan 1 mg IV push both q 1hr prn    Pt repositioned in bed using cloth underpad like a stretcher; pillow placed between Pt knees mild grimacing/Oxycodone 5 mg IV push administered prior to .

## 2023-08-15 NOTE — DISCHARGE PLANNING
Case Management Discharge Planning    Admission Date: 8/13/2023  GMLOS: 3.8  ALOS: 2    6-Clicks ADL Score: 9  6-Clicks Mobility Score: 10      Anticipated Discharge Dispo: Discharge Disposition: D/T to hospice home (50)    DME Needed: No    Action(s) Taken: OTHER    Escalations Completed: None    Medically Clear: Yes    Next Steps: LSW to follow up with patient and medical team regarding discharge needs and barriers.     Barriers to Discharge: Hospice Setup    **1045  Patient discussed during IDT rounds. Per team, patient has transitioned to comfort care and hospice. LSW requested hospice order from resident.     **1217  LSW spoke to patient's son Keaton. Obtained contact information for him [phone]. LSW obtained choice for [facility] for GIP evaluation. If not appropriate, will work on discharge home.     **1233  LSW requested hospice order again.

## 2023-08-15 NOTE — PROGRESS NOTES
Hospital Medicine Daily Progress Note    Date of Service  8/15/2023    Chief Complaint  Yesika Aguilar is a 78 y.o. female admitted 8/13/2023 with fall    Hospital Course    77-year-old female with a past medical history of hypertension, GERD, reported history of open heart surgery, frequent falls, history of PAD status post stents presents emergency department on 8/13/2023 after being found on the ground in the bathroom.  Patient reported that she was feeling lightheaded and she went to kneel down on the bathtub to adjust for acid and lost consciousness.  Patient was on the ground and called EMS.  Patient denies change in position, chest pain, or dyspnea.  No hematochezia, melena, nausea vomiting or hematemesis.     At the emergency department, stable vitals.  Patient on 2 L nasal cannula.  CBC with leukocytosis at 13.7, elevated polys and normocytic anemia with hemoglobin at 9.8.  Chemistry with hyponatremia, high anion gap metabolic acidosis bicarbonate at 13, acute renal failure with creatinine at 1.89, BUN 59 elevated AST.  Lactic acid 3.1 and troponin 81.  CPK at 1,700.  Head CT with no acute intracranial abnormalities.  Maxillofacial CT without fractures.  No fractures noted on C and T-spine CT.  Chest/abdominal/pelvic CT showing left intertrochanteric femur fracture.  EDP discussed case with orthopedic surgery (Dr. Trevino) who will evaluate. Patient received a 2 L bolus and a dose of IV Rocephin. He was admitted for traumatic rhabdomyolysis causing acute renal failure, hyponatremia, and left femur fracture which requires urgent orthopedic surgery evaluation for possible surgical intervention and aggressive IV hydration and daily lab monitoring.     Patient was pressure started to trend down and hospitalist team evaluated this patient and after discussion transition her care to comfort care measures only.    Interval Problem Update  Patient is resting comfortably at bedside with intermittent agonal  breaths. Family at bedside. GIP consult placed.     I have discussed this patient's plan of care and discharge plan at IDT rounds today with Case Management, Nursing, Nursing leadership, and other members of the IDT team.    Consultants/Specialty  orthopedics    Code Status  Comfort Care/DNR    Disposition  The patient is medically cleared for discharge to home or a post-acute facility.  Anticipate discharge to: hospice    I have placed the appropriate orders for post-discharge needs.    Review of Systems  Review of Systems   Unable to perform ROS: Medical condition      Physical Exam  Temp:  [36.6 °C (97.8 °F)] 36.6 °C (97.8 °F)  Pulse:  [82-96] 82  Resp:  [20] 20  BP: ()/(49-51) 104/49  SpO2:  [93 %-99 %] 93 %    Physical Exam  I deferred physical exam as patient is on comfort care.    Fluids  No intake or output data in the 24 hours ending 08/15/23 0815    Laboratory  Recent Labs     08/13/23 1717 08/14/23 0222   WBC 13.7* 12.0*   RBC 3.39* 2.71*   HEMOGLOBIN 9.8* 8.1*   HEMATOCRIT 30.2* 24.8*   MCV 89.1 91.5   MCH 28.9 29.9   MCHC 32.5 32.7   RDW 48.3 51.0*   PLATELETCT 192 182   MPV 10.2 10.0     Recent Labs     08/13/23 1717 08/14/23 0222   SODIUM 129* 133*   POTASSIUM 4.0 3.7   CHLORIDE 97 101   CO2 13* 16*   GLUCOSE 90 77   BUN 50* 46*   CREATININE 1.89* 1.40   CALCIUM 8.2* 7.7*                   Imaging  DX-FEMUR-2+ LEFT   Final Result            Acute comminuted and displaced left intertrochanteric fracture.      DX-PELVIS-1 OR 2 VIEWS   Final Result         Acute comminuted and displaced left intertrochanteric fracture.      CT-LSPINE W/O PLUS RECONS   Final Result      No acute abnormality identified.      CT-CHEST,ABDOMEN,PELVIS WITH   Final Result      1.  Intratrochanteric LEFT femur fracture   2.  No other acute traumatic injury in the chest, abdomen or pelvis   3.  Atherosclerosis   4.  4 cm ascending thoracic aortic aneurysm   5.  Prior cholecystectomy   6.  Prior hysterectomy   7.  12 mm  hypodense LEFT lower pole renal lesion could be a proteinaceous or hemorrhagic cyst or a small renal mass. This should be amenable to further assessment with ultrasound when clinically appropriate.      CT-TSPINE W/O PLUS RECONS   Final Result         1. No acute fracture or malalignment appreciated in the thoracic spine      2. Likely ankylosing spondylitis.         CT-CSPINE WITHOUT PLUS RECONS   Final Result      No acute abnormality identified.      CT-MAXILLOFACIAL W/O PLUS RECONS   Final Result         No acute maxillofacial fracture.      CT-HEAD W/O   Final Result         1. No acute intracranial abnormality. No evidence of acute intracranial hemorrhage or mass lesion.                        Scheduled Medications   Medication Dose Frequency    acetaminophen  1,000 mg TID      Assessment/Plan  * Rhabdomyolysis- (present on admission)  Assessment & Plan  Traumatic rhabdomyolysis with CPK in the 1700s  Complicated by acute renal failure  Aggressive IV hydration  As needed Lasix if signs of fluid overload noted    Continue comfort care measures only.    Advance care planning- (present on admission)  Assessment & Plan  I had extensive conversation with the patient and grandson at bedside.  Patient has POLST form that reports comfort care measures only and no x-ray, antibiotics or tube feeds.  Discussed with patient for which she reports that she wants to be DO NOT RESUSCITATE and DO NOT INTUBATE.  Patient is in disagreement with other measures and request to continue medical management.    Time spent providing coordinating care 32 minutes    COVID- (present on admission)  Assessment & Plan  Tested positive for COVID  Reported that had onset of immunization  Complicated by acute hypoxic respiratory failure  Chest CT clear    Continue comfort care measures only.    Acute respiratory failure with hypoxia (HCC)- (present on admission)  Assessment & Plan  On 2 L nasal cannula  Secondary to possible aspiration and  COVID-pneumonia  Reports chronic history of aspiration    Continue comfort care measures only.    Elevated troponin- (present on admission)  Assessment & Plan  Possibly secondary to trauma and acute renal failure    \Continue comfort care measures only.    Closed 2-part intertrochanteric fracture of left femur (HCC)- (present on admission)  Assessment & Plan  Noted on x-ray and CT imaging  EDP discussed case with orthopedic surgery (Dr. Trevino), who evaluate case  Admit to telemetry      Continue comfort care measures only.    Normocytic anemia- (present on admission)  Assessment & Plan  Pending iron studies  Pending B12    Continue comfort care measures only.    LFT elevation- (present on admission)  Assessment & Plan  Likely secondary to fall and injury  CT imaging with normal hepatic and biliary system, history of cholecystectomy    Continue comfort care measures only.    Lactic acidosis- (present on admission)  Assessment & Plan  Etiology unclear  Likely secondary to injury from rhabdomyolysis  Chest CT without reports of consolidation or atelectasis  No pyelonephritis or fat stranding noted on abdominal imaging    Continue comfort care measures only.    Hyponatremia- (present on admission)  Assessment & Plan  Secondary to dehydration  IV hydration  Labs in a.m.    Acute renal failure with tubular necrosis (HCC)- (present on admission)  Assessment & Plan  Secondary to rhabdomyolysis  Abdominal/pelvic CT without signs of hydronephrosis or obstruction  Aggressive IV hydration  Monitor for signs of fluid overload    Continue comfort care measures only.       VTE prophylaxis: Patient is on comfort care discontinued DVT prophylaxis.    I have performed a physical exam and reviewed and updated ROS and Plan today (8/15/2023). In review of yesterday's note (8/14/2023), there are no changes except as documented above.

## 2023-08-16 PROBLEM — E44.0 MODERATE PROTEIN-CALORIE MALNUTRITION (HCC): Status: ACTIVE | Noted: 2023-01-01

## 2023-08-16 NOTE — PROGRESS NOTES
"Pt repositioned at 01:05 no s/sx of discomfort during process. Family at bedside daughter agreed no discomfort. Pt  apneic . Next dose of Morphine 5 mg & Lorazepam 1 mg administered at 02:10. Pt assessed at 03:00 resting comfortably, strong carotid  pulse, apneic breathing. Family at bedside. Grandson stated, \"I just checked on her\" when I walked into the room agreed she is comfortable at this time.  "

## 2023-08-16 NOTE — PROGRESS NOTES
[facility] Medicine Daily Progress Note    Date of Service  8/16/2023      [facility] Team: R IM White Team   Attending: Markus Muñoz M.d.  Senior Resident: Dr. Carmelita Putnam MD   Contact Number: [phone]    Chief Complaint  Yesika Aguilar is a 78 y.o. female admitted 8/13/2023 with fall    Hospital Course    77-year-old female with a past medical history of hypertension, GERD, reported history of open heart surgery, frequent falls, history of PAD status post stents presents emergency department on 8/13/2023 after being found on the ground in the bathroom.  Patient reported that she was feeling lightheaded and she went to kneel down on the bathtub to adjust for acid and lost consciousness.  Patient was on the ground and called EMS.  Patient denies change in position, chest pain, or dyspnea.  No hematochezia, melena, nausea vomiting or hematemesis.     At the emergency department, stable vitals.  Patient on 2 L nasal cannula.  CBC with leukocytosis at 13.7, elevated polys and normocytic anemia with hemoglobin at 9.8.  Chemistry with hyponatremia, high anion gap metabolic acidosis bicarbonate at 13, acute renal failure with creatinine at 1.89, BUN 59 elevated AST.  Lactic acid 3.1 and troponin 81.  CPK at 1,700.  Head CT with no acute intracranial abnormalities.  Maxillofacial CT without fractures.  No fractures noted on C and T-spine CT.  Chest/abdominal/pelvic CT showing left intertrochanteric femur fracture.  EDP discussed case with orthopedic surgery (Dr. Trevino) who will evaluate. Patient received a 2 L bolus and a dose of IV Rocephin. He was admitted for traumatic rhabdomyolysis causing acute renal failure, hyponatremia, and left femur fracture which requires urgent orthopedic surgery evaluation for possible surgical intervention and aggressive IV hydration and daily lab monitoring.     Blood pressure was trending down and hospitalist team evaluated patient and after discussion, she was transitioned to  comfort care measures only.    Interval Problem Update  Patient does not qualify for GIP at [facility] as she is comfort care and imminent.   This morning, resting in bed comfortably with daughter and grandson at bedside.  Pain controlled with morphine and agitation controlled with ativan.    I have discussed this patient's plan of care and discharge plan at IDT rounds today with Case Management, Nursing, Nursing leadership, and other members of the IDT team.    Consultants/Specialty  orthopedics    Code Status  Comfort Care/DNR    Disposition  The patient is medically cleared for discharge to home or a post-acute facility.  Anticipate discharge to: hospice    I have placed the appropriate orders for post-discharge needs.    Review of Systems  Review of Systems   Unable to perform ROS: Medical condition      Physical Exam  Temp:  [37.1 °C (98.7 °F)] 37.1 °C (98.7 °F)  Pulse:  [82-95] 82  Resp:  [19] 19  BP: (112)/(55) 112/55  SpO2:  [80 %-90 %] 80 %    Physical Exam  Physical exam deferred as patient is comfort care.  GEN: Resting comfortably in bed with agonal breaths.     Fluids    Intake/Output Summary (Last 24 hours) at 8/16/2023 1153  Last data filed at 8/15/2023 1700  Gross per 24 hour   Intake --   Output 800 ml   Net -800 ml       Laboratory  Recent Labs     08/13/23  1717 08/14/23 0222   WBC 13.7* 12.0*   RBC 3.39* 2.71*   HEMOGLOBIN 9.8* 8.1*   HEMATOCRIT 30.2* 24.8*   MCV 89.1 91.5   MCH 28.9 29.9   MCHC 32.5 32.7   RDW 48.3 51.0*   PLATELETCT 192 182   MPV 10.2 10.0     Recent Labs     08/13/23  1717 08/14/23 0222   SODIUM 129* 133*   POTASSIUM 4.0 3.7   CHLORIDE 97 101   CO2 13* 16*   GLUCOSE 90 77   BUN 50* 46*   CREATININE 1.89* 1.40   CALCIUM 8.2* 7.7*                   Imaging  DX-FEMUR-2+ LEFT   Final Result            Acute comminuted and displaced left intertrochanteric fracture.      DX-PELVIS-1 OR 2 VIEWS   Final Result         Acute comminuted and displaced left intertrochanteric fracture.       CT-LSPINE W/O PLUS RECONS   Final Result      No acute abnormality identified.      CT-CHEST,ABDOMEN,PELVIS WITH   Final Result      1.  Intratrochanteric LEFT femur fracture   2.  No other acute traumatic injury in the chest, abdomen or pelvis   3.  Atherosclerosis   4.  4 cm ascending thoracic aortic aneurysm   5.  Prior cholecystectomy   6.  Prior hysterectomy   7.  12 mm hypodense LEFT lower pole renal lesion could be a proteinaceous or hemorrhagic cyst or a small renal mass. This should be amenable to further assessment with ultrasound when clinically appropriate.      CT-TSPINE W/O PLUS RECONS   Final Result         1. No acute fracture or malalignment appreciated in the thoracic spine      2. Likely ankylosing spondylitis.         CT-CSPINE WITHOUT PLUS RECONS   Final Result      No acute abnormality identified.      CT-MAXILLOFACIAL W/O PLUS RECONS   Final Result         No acute maxillofacial fracture.      CT-HEAD W/O   Final Result         1. No acute intracranial abnormality. No evidence of acute intracranial hemorrhage or mass lesion.                        Scheduled Medications   Medication Dose Frequency    acetaminophen  1,000 mg TID      Assessment/Plan  * Rhabdomyolysis- (present on admission)  Assessment & Plan  Traumatic rhabdomyolysis with CPK in the 1700s  Complicated by acute renal failure  Aggressive IV hydration  As needed Lasix if signs of fluid overload noted    Continue comfort care measures only.    Acute renal failure with tubular necrosis (HCC)- (present on admission)  Assessment & Plan  Secondary to rhabdomyolysis  Abdominal/pelvic CT without signs of hydronephrosis or obstruction  Aggressive IV hydration  Monitor for signs of fluid overload    Continue comfort care measures only.    COVID- (present on admission)  Assessment & Plan  Tested positive for COVID  Reported that had onset of immunization  Complicated by acute hypoxic respiratory failure  Chest CT clear    Continue comfort  care measures only.    Acute respiratory failure with hypoxia (HCC)- (present on admission)  Assessment & Plan  On 2 L nasal cannula  Secondary to possible aspiration and COVID-pneumonia  Reports chronic history of aspiration    Continue comfort care measures only.    Elevated troponin- (present on admission)  Assessment & Plan  Possibly secondary to trauma and acute renal failure    Continue comfort care measures only.    Closed 2-part intertrochanteric fracture of left femur (HCC)- (present on admission)  Assessment & Plan  Noted on x-ray and CT imaging  EDP discussed case with orthopedic surgery (Dr. Trevino), who evaluate case  Admit to telemetry      Continue comfort care measures only.    Normocytic anemia- (present on admission)  Assessment & Plan  Pending iron studies  Pending B12    Continue comfort care measures only.    LFT elevation- (present on admission)  Assessment & Plan  Likely secondary to fall and injury  CT imaging with normal hepatic and biliary system, history of cholecystectomy    Continue comfort care measures only.    Lactic acidosis- (present on admission)  Assessment & Plan  Etiology unclear  Likely secondary to injury from rhabdomyolysis  Chest CT without reports of consolidation or atelectasis  No pyelonephritis or fat stranding noted on abdominal imaging    Continue comfort care measures only.    Hyponatremia- (present on admission)  Assessment & Plan  Secondary to dehydration    Continue comfort care measures only    Advance care planning- (present on admission)  Assessment & Plan  I had extensive conversation with the patient and grandson at bedside.  Patient has POLST form that reports comfort care measures only and no x-ray, antibiotics or tube feeds.  Discussed with patient for which she reports that she wants to be DO NOT RESUSCITATE and DO NOT INTUBATE.  Patient is in disagreement with other measures and request to continue medical management.    Time spent providing coordinating  care 32 minutes       VTE prophylaxis: Patient is on comfort care discontinued DVT prophylaxis.    Thank you very much for allowing me to participate in this patient's care.I have performed a physical exam and reviewed and updated ROS and Plan today (8/16/2023). In review of yesterday's note (8/15/2023), there are no changes except as documented above. All plans of care were discussed with the attending physician. Please contact me with any questions.    Carmelita Putnam M.D.  Internal Medicine Resident

## 2023-08-16 NOTE — CARE PLAN
The patient is Watcher - Medium risk of patient condition declining or worsening    Shift Goals  Clinical Goals: Comfort  Patient Goals: Comfort  Family Goals: Comfort    Progress made toward(s) clinical / shift goals:      Problem: Pain - Standard  Goal: Alleviation of pain or a reduction in pain to the patient’s comfort goal  Outcome: Progressing  Note: Pt Comfort Care pain is managed with Morphine 1mg IV push/anxiety/restlessness Lorazepam 1 mg IV push. Family Daughter and Grandson at bedside

## 2023-08-16 NOTE — HOSPICE
Joint visit with Rosalva TORREZ.   Patient appears comfortable on current medication regiment. She has periods of Apnea.   Family agrees she is  comfortable.

## 2023-08-17 NOTE — CARE PLAN
The patient is Stable - Low risk of patient condition declining or worsening    Shift Goals  Clinical Goals: comfort care  Patient Goals: comfort care  Family Goals: comfort care    Progress made toward(s) clinical / shift goals:    Problem: Pain - Standard  Goal: Alleviation of pain or a reduction in pain to the patient’s comfort goal  Outcome: Progressing  Flowsheets (Taken 8/15/2023 2117 by Keke Up R.N.)  Pain Rating Scale (NPRS): 1  Note: Pt resting between medication admin, comfort care     Problem: Fall Risk  Goal: Patient will remain free from falls  Outcome: Progressing  Note: Pt will remain free of falls, bedbound, comfort care       Patient is not progressing towards the following goals:

## 2023-08-17 NOTE — PROGRESS NOTES
Received report and assumed care of patient. Patient alert and oriented x 0, on RA with apneic breathing. Patient is comfort care with family at bedside. Assessment completed. Patient's family updated regarding plan of care, all questions answered. Bed in lock and lowest position with bed exit frame alarm on. Call light and belongings are within reach. Educated on room and call light, patient's family verbalized understanding. Patient's family expressed no further needs at this time.

## 2023-08-17 NOTE — CARE PLAN
The patient is Stable - Low risk of patient condition declining or worsening    Shift Goals  Clinical Goals: Comfort care  Patient Goals: Comfort care  Family Goals: Comfort care    Progress made toward(s) clinical / shift goals:      Patient is not progressing towards the following goals:      Problem: Pain - Standard  Goal: Alleviation of pain or a reduction in pain to the patient’s comfort goal  Outcome: Progressing     PRN pain medications in use for pain control.  Family agreed on Q2 hour morphine for comfort care measures.    Problem: Knowledge Deficit - Standard  Goal: Patient and family/care givers will demonstrate understanding of plan of care, disease process/condition, diagnostic tests and medications  Outcome: Progressing     Family agreeable and at peace with comfort care measures. Family at bedside.

## 2023-08-17 NOTE — HOSPICE
Patient is imminent. Respiration 8-10, periods of 10-15sec. apnea. Slight mottling noted on LEs. Appears to be comfortble as she actively transitions.Update SHAN Schaefer. Not appropriate for GIP as symptoms are well managed with current comfort care medication regimen.

## 2023-08-18 PROBLEM — Z66 DNR (DO NOT RESUSCITATE): Status: ACTIVE | Noted: 2023-08-18

## 2023-08-18 LAB
BACTERIA BLD CULT: NORMAL
BACTERIA BLD CULT: NORMAL
SIGNIFICANT IND 70042: NORMAL
SIGNIFICANT IND 70042: NORMAL
SITE SITE: NORMAL
SITE SITE: NORMAL
SOURCE SOURCE: NORMAL
SOURCE SOURCE: NORMAL

## 2023-08-18 NOTE — CARE PLAN
The patient is Watcher - Medium risk of patient condition declining or worsening    Shift Goals  Clinical Goals: comfort care  Patient Goals: comfort, rest  Family Goals: comfort care      Problem: Pain - Standard  Goal: Alleviation of pain or a reduction in pain to the patient’s comfort goal  Outcome: Progressing  Note: Increased administration frequency and dose of PRN morphine to Q1 hour and 10mg from Q2h 5mg, pt appears to be comfortable after med administration. Per family members at bedside they wish to decline bed turns.      Problem: Fall Risk  Goal: Patient will remain free from falls  Outcome: Progressing  Note: Discussed patient mobility status with interdisciplinary team, fall precautions in place, pt fall prevention education done, pt and family educated to call for assistance when needed.

## 2023-08-18 NOTE — PROGRESS NOTES
[facility] Medicine Daily Progress Note    Date of Service  8/17/2023      [facility] Team: R IM White Team   Attending: Markus Muñoz M.d.  Senior Resident:    Contact Number: [phone]    Chief Complaint  Yesika Aguilar is a 78 y.o. female admitted 8/13/2023 with fall    Hospital Course    77-year-old female with a past medical history of hypertension, GERD, reported history of open heart surgery, frequent falls, history of PAD status post stents presents emergency department on 8/13/2023 after being found on the ground in the bathroom.  Patient reported that she was feeling lightheaded and she went to kneel down on the bathtub to adjust for acid and lost consciousness.  Patient was on the ground and called EMS.  Patient denies change in position, chest pain, or dyspnea.  No hematochezia, melena, nausea vomiting or hematemesis.     At the emergency department, stable vitals.  Patient on 2 L nasal cannula.  CBC with leukocytosis at 13.7, elevated polys and normocytic anemia with hemoglobin at 9.8.  Chemistry with hyponatremia, high anion gap metabolic acidosis bicarbonate at 13, acute renal failure with creatinine at 1.89, BUN 59 elevated AST.  Lactic acid 3.1 and troponin 81.  CPK at 1,700.  Head CT with no acute intracranial abnormalities.  Maxillofacial CT without fractures.  No fractures noted on C and T-spine CT.  Chest/abdominal/pelvic CT showing left intertrochanteric femur fracture.  EDP discussed case with orthopedic surgery (Dr. Trevino) who will evaluate. Patient received a 2 L bolus and a dose of IV Rocephin. He was admitted for traumatic rhabdomyolysis causing acute renal failure, hyponatremia, and left femur fracture which requires urgent orthopedic surgery evaluation for possible surgical intervention and aggressive IV hydration and daily lab monitoring.     Blood pressure was trending down and hospitalist team evaluated patient and after discussion, she was transitioned to comfort care measures  only.    Interval Problem Update  08/17/23  Patient seen and examined at bedside.  Continues to appear cyanotic with apneic spells.  Pupils are pinpoint.  No significant response to stimuli.  Discussed with daughter at bedside.  She is hoping that the patient will pass soon to alleviate her suffering.  Patient continues to receive IV morphine.  Patient is not a candidate for GIP.      I have discussed this patient's plan of care and discharge plan at IDT rounds today with Case Management, Nursing, Nursing leadership, and other members of the IDT team.    Consultants/Specialty  orthopedics    Code Status  Comfort Care/DNR    Disposition  The patient is not medically cleared for discharge to home or a post-acute facility.  Anticipate discharge to: hospice    I have placed the appropriate orders for post-discharge needs.    Review of Systems  Review of Systems   Unable to perform ROS: Medical condition      Physical Exam  Temp:  [37.1 °C (98.8 °F)] 37.1 °C (98.8 °F)  Pulse:  [92] 92  Resp:  [9-16] 10  SpO2:  [77 %] 77 %    Physical Exam  Constitutional:       Appearance: She is ill-appearing.   Eyes:      Comments: Pinpoint pupils nonreactive   Cardiovascular:      Rate and Rhythm: Normal rate and regular rhythm.      Heart sounds: Normal heart sounds.   Pulmonary:      Effort: Prolonged expiration present.      Breath sounds: Normal breath sounds.      Comments: Process of apnea  Skin:     Coloration: Skin is pale.   Neurological:      Comments: Note significant response to stimuli   Psychiatric:      Comments: Unable to assess           Fluids  No intake or output data in the 24 hours ending 08/17/23 9951      Laboratory                            Imaging  DX-FEMUR-2+ LEFT   Final Result            Acute comminuted and displaced left intertrochanteric fracture.      DX-PELVIS-1 OR 2 VIEWS   Final Result         Acute comminuted and displaced left intertrochanteric fracture.      CT-LSPINE W/O PLUS RECONS   Final  "Result      No acute abnormality identified.      CT-CHEST,ABDOMEN,PELVIS WITH   Final Result      1.  Intratrochanteric LEFT femur fracture   2.  No other acute traumatic injury in the chest, abdomen or pelvis   3.  Atherosclerosis   4.  4 cm ascending thoracic aortic aneurysm   5.  Prior cholecystectomy   6.  Prior hysterectomy   7.  12 mm hypodense LEFT lower pole renal lesion could be a proteinaceous or hemorrhagic cyst or a small renal mass. This should be amenable to further assessment with ultrasound when clinically appropriate.      CT-TSPINE W/O PLUS RECONS   Final Result         1. No acute fracture or malalignment appreciated in the thoracic spine      2. Likely ankylosing spondylitis.         CT-CSPINE WITHOUT PLUS RECONS   Final Result      No acute abnormality identified.      CT-MAXILLOFACIAL W/O PLUS RECONS   Final Result         No acute maxillofacial fracture.      CT-HEAD W/O   Final Result         1. No acute intracranial abnormality. No evidence of acute intracranial hemorrhage or mass lesion.                          Scheduled Medications   Medication Dose Frequency    acetaminophen  1,000 mg TID      Assessment/Plan  * Rhabdomyolysis- (present on admission)  Assessment & Plan  Traumatic rhabdomyolysis with CPK in the 1700s  Complicated by acute renal failure  Aggressive IV hydration  As needed Lasix if signs of fluid overload noted    Continue comfort care measures only.    Moderate protein-calorie malnutrition (HCC)- (present on admission)  Assessment & Plan  Patient has signs of subcutaneous tissue and muscle wasting.    Patient is comfort care measures.    Comfort measures only status  Assessment & Plan  Continue morphine as needed.  Patient appears comfortable.  Continue morphine as needed.    Advance care planning- (present on admission)  Assessment & Plan  As per previous hospitalist.    \"I had extensive conversation with the patient and grandson at bedside.  Patient has POLST form that " "reports comfort care measures only and no x-ray, antibiotics or tube feeds.  Discussed with patient for which she reports that she wants to be DO NOT RESUSCITATE and DO NOT INTUBATE.  Patient is in disagreement with other measures and request to continue medical management.\"      COVID- (present on admission)  Assessment & Plan  Tested positive for COVID  Reported that had onset of immunization  Complicated by acute hypoxic respiratory failure  Chest CT clear    Continue comfort care measures only.    Acute respiratory failure with hypoxia (HCC)- (present on admission)  Assessment & Plan  On 2 L nasal cannula  Secondary to possible aspiration and COVID-pneumonia  Reports chronic history of aspiration    Continue comfort care measures only.    Elevated troponin- (present on admission)  Assessment & Plan  Possibly secondary to trauma and acute renal failure    Continue comfort care measures only.    Closed 2-part intertrochanteric fracture of left femur (HCC)- (present on admission)  Assessment & Plan  Noted on x-ray and CT imaging  EDP discussed case with orthopedic surgery (Dr. Trevino), who evaluate case  Admit to telemetry      Continue comfort care measures only.    Normocytic anemia- (present on admission)  Assessment & Plan  Pending iron studies  Pending B12    Continue comfort care measures only.    LFT elevation- (present on admission)  Assessment & Plan  Likely secondary to fall and injury  CT imaging with normal hepatic and biliary system, history of cholecystectomy    Continue comfort care measures only.    Lactic acidosis- (present on admission)  Assessment & Plan  Etiology unclear  Likely secondary to injury from rhabdomyolysis  Chest CT without reports of consolidation or atelectasis  No pyelonephritis or fat stranding noted on abdominal imaging    Continue comfort care measures only.    Hyponatremia- (present on admission)  Assessment & Plan  Secondary to dehydration    Continue comfort care measures " only    Acute renal failure with tubular necrosis (HCC)- (present on admission)  Assessment & Plan  Secondary to rhabdomyolysis  Abdominal/pelvic CT without signs of hydronephrosis or obstruction  Aggressive IV hydration  Monitor for signs of fluid overload    Continue comfort care measures only.       VTE prophylaxis: Patient is on comfort care discontinued DVT prophylaxis.    Thank you very much for allowing me to participate in this patient's care.I have performed a physical exam and reviewed and updated ROS and Plan today (8/17/2023). In review of yesterday's note (8/16/2023), there are no changes except as documented above. All plans of care were discussed with the attending physician. Please contact me with any questions.

## 2023-08-20 PROBLEM — Z95.828 PRESENCE OF ARTERIAL STENT: Status: ACTIVE | Noted: 2023-08-20

## 2023-08-20 PROBLEM — R55 SYNCOPE AND COLLAPSE: Status: ACTIVE | Noted: 2023-08-20

## 2023-08-20 PROBLEM — I73.9 PAD (PERIPHERAL ARTERY DISEASE) (HCC): Status: ACTIVE | Noted: 2023-08-20

## 2023-08-20 PROBLEM — Z78.9 DNI (DO NOT INTUBATE): Status: ACTIVE | Noted: 2023-08-20

## 2023-08-20 PROBLEM — D50.9 IRON DEFICIENCY ANEMIA: Status: ACTIVE | Noted: 2023-08-20

## 2023-08-21 NOTE — DISCHARGE SUMMARY
Death Summary    Cause of Death  Cardiopulmonary arrest due to acute renal failure with tubular necrosis due to rhabdomyolysis due to ground-level fall due to syncope and collapse due to COVID-19 infection    Comorbid Conditions at the Time of Death  Principal Problem:    Rhabdomyolysis (POA: Yes)  Active Problems:    Acute renal failure with tubular necrosis (HCC) (POA: Yes)    Hyponatremia (POA: Yes)    Lactic acidosis (POA: Yes)    LFT elevation (POA: Yes)    Normocytic anemia (POA: Yes)    Closed 2-part intertrochanteric fracture of left femur (HCC) (POA: Yes)    Elevated troponin (POA: Yes)    Acute respiratory failure with hypoxia (HCC) (POA: Yes)    COVID-19 (POA: Yes)    Advance care planning (POA: Yes)    Comfort measures only status (POA: No)    Moderate protein-calorie malnutrition (HCC) (POA: Yes)    DNR (do not resuscitate) (POA: Yes)    PAD (peripheral artery disease) (HCC) (POA: Yes)    Presence of arterial stent (POA: Yes)    Syncope and collapse (POA: Yes)    DNI (do not intubate) (POA: Unknown)    Iron deficiency anemia (POA: Yes)  Resolved Problems:    * No resolved hospital problems. *      History of Presenting Illness and Hospital Course  Yesika Aguilar is a 78 y.o. female admitted 8/13/2023 with ground-level fall.  This is a pleasant woman with a history of hypertension, GERD, open heart surgery, frequent falls, peripheral arterial disease status post stents.  Patient presented to the emergency room after being found on the ground in the bathroom.  She reported feeling lightheaded and kneeled down on the bathtub when she lost consciousness.  She was on the ground and called EMS.    In the emergency room, patient was requiring supplemental oxygen 2 LPM via nasal cannula.  She had a high anion gap metabolic acidosis with a bicarb of 13 as well as a leukocytosis and lactic acidosis.  CPK was elevated at 1700.  Head CT was normal.  Maxillofacial CT scan did not show any fractures.  CT scan of  the abdomen, chest, and pelvis revealed a left intertrochanteric femur fracture.  Patient was anemic with hemoglobin of 9.8 with a ferritin of 114 consistent with iron deficiency anemia.  Patient has had a positive for COVID-19 infection.  Her procalcitonin was elevated 0.41 and was started on IV antibiotics.    Patient was admitted for acute renal failure due to traumatic rhabdomyolysis along with plans surgical intervention for her hip fracture.  Patient's blood pressures became unstable during fluid resuscitation, and the patient was transitioned to comfort care measures only.  Patient continued to deteriorate received IV morphine for comfort.  She passed away peacefully on 8/18/2023.      Death Date: 08/17/23   Death Time: 2045         Pronounced By (RN1): Luisana Saunders  Pronounced By (RN2): Adore Duncan